# Patient Record
Sex: MALE | Race: WHITE | Employment: OTHER | URBAN - METROPOLITAN AREA
[De-identification: names, ages, dates, MRNs, and addresses within clinical notes are randomized per-mention and may not be internally consistent; named-entity substitution may affect disease eponyms.]

---

## 2017-02-13 ENCOUNTER — OFFICE VISIT (OUTPATIENT)
Dept: INTERNAL MEDICINE CLINIC | Facility: CLINIC | Age: 77
End: 2017-02-13

## 2017-02-13 VITALS
HEART RATE: 77 BPM | HEIGHT: 70 IN | BODY MASS INDEX: 30.4 KG/M2 | OXYGEN SATURATION: 94 % | TEMPERATURE: 98 F | WEIGHT: 212.38 LBS | DIASTOLIC BLOOD PRESSURE: 64 MMHG | SYSTOLIC BLOOD PRESSURE: 108 MMHG

## 2017-02-13 DIAGNOSIS — J06.9 URI, ACUTE: ICD-10-CM

## 2017-02-13 DIAGNOSIS — R30.0 DYSURIA: ICD-10-CM

## 2017-02-13 DIAGNOSIS — I25.10 CORONARY ARTERY DISEASE INVOLVING NATIVE CORONARY ARTERY OF NATIVE HEART WITHOUT ANGINA PECTORIS: ICD-10-CM

## 2017-02-13 DIAGNOSIS — D68.9 COAGULOPATHY (HCC): ICD-10-CM

## 2017-02-13 DIAGNOSIS — I10 ESSENTIAL HYPERTENSION WITH GOAL BLOOD PRESSURE LESS THAN 140/90: ICD-10-CM

## 2017-02-13 DIAGNOSIS — M17.12 PRIMARY OSTEOARTHRITIS OF LEFT KNEE: Primary | ICD-10-CM

## 2017-02-13 DIAGNOSIS — E78.00 PURE HYPERCHOLESTEROLEMIA: ICD-10-CM

## 2017-02-13 PROCEDURE — G0463 HOSPITAL OUTPT CLINIC VISIT: HCPCS | Performed by: INTERNAL MEDICINE

## 2017-02-13 PROCEDURE — 99214 OFFICE O/P EST MOD 30 MIN: CPT | Performed by: INTERNAL MEDICINE

## 2017-02-13 RX ORDER — CEFUROXIME AXETIL 500 MG/1
500 TABLET ORAL 2 TIMES DAILY
Qty: 20 TABLET | Refills: 0 | Status: SHIPPED | OUTPATIENT
Start: 2017-02-13 | End: 2018-04-03

## 2017-02-13 RX ORDER — LORATADINE 10 MG/1
10 TABLET ORAL DAILY
COMMUNITY

## 2017-02-13 NOTE — PROGRESS NOTES
Tari Espinoza is a 68year old male. HPI:   Patient presents with:  Cough: Chest congestion   Pre-Op Exam: Left Knee 3/1 Dr Juni Hunter       69 y/o M who awaits left TKR with orthopedics, Dr Kelton Eden, at [facility] on 3/1/17.   He is taking no analgesics Allergies:    Nuts                    Pain              ROS:   Constitutional: no weight loss; no fatigue  Cardiovascular:  Negative for chest pain; negative palpitations  Respiratory:  Negative for cough, dyspnea and wheezing  Gastrointestinal:  Neg mg po qHS;  Rx per Dr Nanci Casas    Migraine headache  Affects left temporal area; cont Tylenol prn; avoid triptans due to CAD    URI  Ceftin 500 mg po BID #20    Coagulopathy  Check PT/INR    Dysuria  Check UA C&S           Orders This Visit:    Orders Placed T

## 2017-02-14 ENCOUNTER — LAB ENCOUNTER (OUTPATIENT)
Dept: LAB | Facility: HOSPITAL | Age: 77
End: 2017-02-14
Attending: INTERNAL MEDICINE
Payer: MEDICARE

## 2017-02-14 DIAGNOSIS — I10 ESSENTIAL HYPERTENSION WITH GOAL BLOOD PRESSURE LESS THAN 140/90: ICD-10-CM

## 2017-02-14 DIAGNOSIS — R30.0 DYSURIA: ICD-10-CM

## 2017-02-14 DIAGNOSIS — D68.9 COAGULOPATHY (HCC): ICD-10-CM

## 2017-02-14 LAB
ANION GAP SERPL CALC-SCNC: 11 MMOL/L (ref 0–18)
BACTERIA UR QL AUTO: NEGATIVE /HPF
BASOPHILS # BLD: 0 K/UL (ref 0–0.2)
BASOPHILS NFR BLD: 0 %
BILIRUB UR QL: NEGATIVE
BUN SERPL-MCNC: 25 MG/DL (ref 8–20)
BUN/CREAT SERPL: 18.4 (ref 10–20)
CALCIUM SERPL-MCNC: 8.7 MG/DL (ref 8.5–10.5)
CHLORIDE SERPL-SCNC: 99 MMOL/L (ref 95–110)
CLARITY UR: CLEAR
CO2 SERPL-SCNC: 25 MMOL/L (ref 22–32)
COLOR UR: YELLOW
CREAT SERPL-MCNC: 1.36 MG/DL (ref 0.5–1.5)
EOSINOPHIL # BLD: 0.5 K/UL (ref 0–0.7)
EOSINOPHIL NFR BLD: 9 %
ERYTHROCYTE [DISTWIDTH] IN BLOOD BY AUTOMATED COUNT: 13.2 % (ref 11–15)
GLUCOSE SERPL-MCNC: 153 MG/DL (ref 70–99)
GLUCOSE UR-MCNC: NEGATIVE MG/DL
HCT VFR BLD AUTO: 42.7 % (ref 41–52)
HGB BLD-MCNC: 14.3 G/DL (ref 13.5–17.5)
HGB UR QL STRIP.AUTO: NEGATIVE
INR BLD: 1 (ref 0.9–1.2)
KETONES UR-MCNC: NEGATIVE MG/DL
LEUKOCYTE ESTERASE UR QL STRIP.AUTO: NEGATIVE
LYMPHOCYTES # BLD: 1.5 K/UL (ref 1–4)
LYMPHOCYTES NFR BLD: 28 %
MCH RBC QN AUTO: 30.3 PG (ref 27–32)
MCHC RBC AUTO-ENTMCNC: 33.4 G/DL (ref 32–37)
MCV RBC AUTO: 90.7 FL (ref 80–100)
MONOCYTES # BLD: 0.5 K/UL (ref 0–1)
MONOCYTES NFR BLD: 9 %
NEUTROPHILS # BLD AUTO: 3 K/UL (ref 1.8–7.7)
NEUTROPHILS NFR BLD: 55 %
NITRITE UR QL STRIP.AUTO: NEGATIVE
OSMOLALITY UR CALC.SUM OF ELEC: 287 MOSM/KG (ref 275–295)
PH UR: 5 [PH] (ref 5–8)
PLATELET # BLD AUTO: 194 K/UL (ref 140–400)
PMV BLD AUTO: 7.4 FL (ref 7.4–10.3)
POTASSIUM SERPL-SCNC: 4 MMOL/L (ref 3.3–5.1)
PROT UR-MCNC: NEGATIVE MG/DL
PROTHROMBIN TIME: 13.1 SECONDS (ref 11.8–14.5)
RBC # BLD AUTO: 4.71 M/UL (ref 4.5–5.9)
RBC #/AREA URNS AUTO: 1 /HPF
SODIUM SERPL-SCNC: 135 MMOL/L (ref 136–144)
SP GR UR STRIP: 1.02 (ref 1–1.03)
UROBILINOGEN UR STRIP-ACNC: <2
VIT C UR-MCNC: 40 MG/DL
WBC # BLD AUTO: 5.4 K/UL (ref 4–11)
WBC #/AREA URNS AUTO: <1 /HPF

## 2017-02-14 PROCEDURE — 85025 COMPLETE CBC W/AUTO DIFF WBC: CPT

## 2017-02-14 PROCEDURE — 81003 URINALYSIS AUTO W/O SCOPE: CPT

## 2017-02-14 PROCEDURE — 85610 PROTHROMBIN TIME: CPT

## 2017-02-14 PROCEDURE — 80048 BASIC METABOLIC PNL TOTAL CA: CPT

## 2017-02-14 PROCEDURE — 36415 COLL VENOUS BLD VENIPUNCTURE: CPT

## 2017-02-14 PROCEDURE — 87086 URINE CULTURE/COLONY COUNT: CPT

## 2017-02-16 ENCOUNTER — TELEPHONE (OUTPATIENT)
Dept: INTERNAL MEDICINE CLINIC | Facility: CLINIC | Age: 77
End: 2017-02-16

## 2017-02-16 NOTE — TELEPHONE ENCOUNTER
Completed forms, labs, etc faxed to [facility] [place]: [phone]. Noted on fax cover sheet that 12/5/16 EKG at [facility] [place]. Fax receipt confirmed.

## 2017-02-16 NOTE — TELEPHONE ENCOUNTER
Please fax surgical clearance to [facility]. Pt is scheduled for surgery on 3/1/17 with Dr Bhaskar Carmichael has pre-op at [facility] on Tuesday, 2/21/17  Send medical clearance, labs and EKG.  If this was not completed please fax whatever is available  BVF#[phone]

## 2017-02-16 NOTE — TELEPHONE ENCOUNTER
All labs and urine OK; please notify pt; last EKG done at [address] on 12/5/16 at [address]; please FAX forms (attached in folder)

## 2017-02-16 NOTE — TELEPHONE ENCOUNTER
Christina wolfe to send copy of 2/13/17 office visit note and 2/14/17 lab results?  I do not see that EKG was done

## 2017-03-20 ENCOUNTER — OFFICE VISIT (OUTPATIENT)
Dept: PHYSICAL THERAPY | Facility: HOSPITAL | Age: 77
End: 2017-03-20
Attending: ORTHOPAEDIC SURGERY
Payer: MEDICARE

## 2017-03-20 DIAGNOSIS — Z96.652 STATUS POST TOTAL LEFT KNEE REPLACEMENT: Primary | ICD-10-CM

## 2017-03-20 PROCEDURE — 97110 THERAPEUTIC EXERCISES: CPT

## 2017-03-20 PROCEDURE — 97162 PT EVAL MOD COMPLEX 30 MIN: CPT

## 2017-03-20 NOTE — PROGRESS NOTES
KNEE EVALUATION:   Referring Physician: Dr. Donna Cuenca  Diagnosis: s/p L TKA  Date of Service: 3/20/2017     PATIENT SUMMARY:   Steven Torres is a 68year old y/o male who presents to therapy today with complaints of L knee pain s/p L TKA on 3/1/17.   P quad  Observation/Skin Assessment: incision site clean and healing.    Edema: at knee jt line R: 39.5cm  cm, L 43.5 cm;     AROM:   Knee    Flexion: L 110 deg   Extension: L lacking 9 deg     PROM:   Knee    Flexion:  L 114 deg heel slide   Extension:L lack education; Home Exercise Program    Education or treatment limitation: None  Rehab Potential: good    FOTO: not captured/100   Current status G Code: InitialMobility: Walking and Moving AroundCL: 60-79% impaired, limited, or restricted  Goal status G Code:

## 2017-03-23 ENCOUNTER — OFFICE VISIT (OUTPATIENT)
Dept: PHYSICAL THERAPY | Facility: HOSPITAL | Age: 77
End: 2017-03-23
Attending: ORTHOPAEDIC SURGERY
Payer: MEDICARE

## 2017-03-23 PROCEDURE — 97110 THERAPEUTIC EXERCISES: CPT

## 2017-03-23 PROCEDURE — 97140 MANUAL THERAPY 1/> REGIONS: CPT

## 2017-03-23 NOTE — PROGRESS NOTES
Diagnosis: s/p L TKA    Authorized # of Visits:  2/10 (per POC)         Next MD visit: none scheduled  Fall Risk: standard         Precautions: n/a           Medication Changes since last visit?: No  Subjective: Pt states that he is having some problems w

## 2017-03-27 ENCOUNTER — OFFICE VISIT (OUTPATIENT)
Dept: PHYSICAL THERAPY | Facility: HOSPITAL | Age: 77
End: 2017-03-27
Attending: ORTHOPAEDIC SURGERY
Payer: MEDICARE

## 2017-03-27 PROCEDURE — 97140 MANUAL THERAPY 1/> REGIONS: CPT

## 2017-03-27 PROCEDURE — 97110 THERAPEUTIC EXERCISES: CPT

## 2017-03-27 NOTE — PROGRESS NOTES
Diagnosis: s/p L TKA    Authorized # of Visits:  3/10 (per POC)         Next MD visit: none scheduled  Fall Risk: standard         Precautions: n/a           Medication Changes since last visit?: No  Subjective: Pt states that his leg feels better and he

## 2017-03-29 ENCOUNTER — OFFICE VISIT (OUTPATIENT)
Dept: PHYSICAL THERAPY | Facility: HOSPITAL | Age: 77
End: 2017-03-29
Attending: ORTHOPAEDIC SURGERY
Payer: MEDICARE

## 2017-03-29 PROCEDURE — 97110 THERAPEUTIC EXERCISES: CPT

## 2017-03-29 PROCEDURE — 97140 MANUAL THERAPY 1/> REGIONS: CPT

## 2017-03-29 NOTE — PROGRESS NOTES
Diagnosis: s/p L TKA    Authorized # of Visits:  4/10 (per POC)         Next MD visit: none scheduled  Fall Risk: standard         Precautions: n/a           Medication Changes since last visit?: No  Subjective: Pt states that he hardly used the pillow la

## 2017-03-31 ENCOUNTER — OFFICE VISIT (OUTPATIENT)
Dept: PHYSICAL THERAPY | Facility: HOSPITAL | Age: 77
End: 2017-03-31
Attending: ORTHOPAEDIC SURGERY
Payer: MEDICARE

## 2017-03-31 PROCEDURE — 97110 THERAPEUTIC EXERCISES: CPT

## 2017-03-31 PROCEDURE — 97140 MANUAL THERAPY 1/> REGIONS: CPT

## 2017-03-31 NOTE — PROGRESS NOTES
Diagnosis: s/p L TKA    Authorized # of Visits:  5/10 (per POC)         Next MD visit: 4/10/17  Fall Risk: standard         Precautions: n/a           Medication Changes since last visit?: No  Subjective: Patient reports his knee got very swollen after la Total Timed Treatment: 43 min  Total Treatment Time: 45 min

## 2017-04-03 ENCOUNTER — OFFICE VISIT (OUTPATIENT)
Dept: PHYSICAL THERAPY | Facility: HOSPITAL | Age: 77
End: 2017-04-03
Attending: ORTHOPAEDIC SURGERY
Payer: MEDICARE

## 2017-04-03 PROCEDURE — 97140 MANUAL THERAPY 1/> REGIONS: CPT

## 2017-04-03 PROCEDURE — 97110 THERAPEUTIC EXERCISES: CPT

## 2017-04-03 NOTE — PROGRESS NOTES
Diagnosis: s/p L TKA    Authorized # of Visits:  6/10 (per POC)         Next MD visit: 4/10/17  Fall Risk: standard         Precautions: n/a           Medication Changes since last visit?: No  Subjective: \"I have been doing pretty good.   I couldn't move HEP training, balance and proprioception training with focus on L knee extension. Charges:  TherEx x 2, MM x 1      Total Timed Treatment: 45 min  Total Treatment Time: 45 min

## 2017-04-05 ENCOUNTER — OFFICE VISIT (OUTPATIENT)
Dept: PHYSICAL THERAPY | Facility: HOSPITAL | Age: 77
End: 2017-04-05
Attending: ORTHOPAEDIC SURGERY
Payer: MEDICARE

## 2017-04-05 PROCEDURE — 97140 MANUAL THERAPY 1/> REGIONS: CPT

## 2017-04-05 PROCEDURE — 97110 THERAPEUTIC EXERCISES: CPT

## 2017-04-05 NOTE — PROGRESS NOTES
Diagnosis: s/p L TKA    Authorized # of Visits:  7/10 (per POC)         Next MD visit: 4/10/17  Fall Risk: standard         Precautions: n/a           Medication Changes since last visit?: No  Subjective: \"I am a little disappointed that I am not better. passive hamstring stretch 4-5x during the day for 1 minute or to his tolerance. Pt is in agreement        Plan: Continue for stretching, strengthening, ROM, HEP training, balance and proprioception training with focus on L knee extension. Charges:  Babs Jimenez

## 2017-04-07 ENCOUNTER — OFFICE VISIT (OUTPATIENT)
Dept: PHYSICAL THERAPY | Facility: HOSPITAL | Age: 77
End: 2017-04-07
Attending: ORTHOPAEDIC SURGERY
Payer: MEDICARE

## 2017-04-07 PROCEDURE — 97140 MANUAL THERAPY 1/> REGIONS: CPT

## 2017-04-07 PROCEDURE — 97110 THERAPEUTIC EXERCISES: CPT

## 2017-04-10 ENCOUNTER — OFFICE VISIT (OUTPATIENT)
Dept: PHYSICAL THERAPY | Facility: HOSPITAL | Age: 77
End: 2017-04-10
Attending: ORTHOPAEDIC SURGERY
Payer: MEDICARE

## 2017-04-10 PROCEDURE — 97140 MANUAL THERAPY 1/> REGIONS: CPT

## 2017-04-10 PROCEDURE — 97110 THERAPEUTIC EXERCISES: CPT

## 2017-04-10 NOTE — PROGRESS NOTES
Diagnosis: s/p L TKA    Authorized # of Visits:  9/10 (per POC)         Next MD visit: 4/14/17  Fall Risk: standard         Precautions: n/a           Medication Changes since last visit?: No  Subjective: \"I walked 1/2 on Saturday and more yesterday.  It PN next session    Charges:  TherEx x 2, MM x 1      Total Timed Treatment: 45 min  Total Treatment Time: 45 min

## 2017-04-12 ENCOUNTER — OFFICE VISIT (OUTPATIENT)
Dept: PHYSICAL THERAPY | Facility: HOSPITAL | Age: 77
End: 2017-04-12
Attending: ORTHOPAEDIC SURGERY
Payer: MEDICARE

## 2017-04-12 PROCEDURE — 97140 MANUAL THERAPY 1/> REGIONS: CPT

## 2017-04-12 PROCEDURE — 97110 THERAPEUTIC EXERCISES: CPT

## 2017-04-12 NOTE — PROGRESS NOTES
Patient Name: Junior Trevino, : 10/1/1940, MRN: [id number]   Date:  2017  Referring Physician:  Ben Caballero    Diagnosis: S/P L TKA  Progress Summary    Pt has attended 10, cancelled 0, and no shown 0 visits in Physical Therapy.      Pr distal  thigh x 3 min -  Not today  Supine contract/relax for knee extension x 15 reps  Not today  SLR with quad set 2x10 with 2# (c/o LBP) Not today  Prone ham curl 20x L NOT TODAY  Prone quad stetch by therapist 30\" x 3   Standing TKE's against wall 10\ Dept: [phone].     Sincerely,  Jorge Wells PT    Electronically signed by therapist: Jorge Wells PT    [de-identified] certification required: Yes  I certify the need for these services furnished under this plan of treatment and while under my care

## 2017-04-14 ENCOUNTER — OFFICE VISIT (OUTPATIENT)
Dept: PHYSICAL THERAPY | Facility: HOSPITAL | Age: 77
End: 2017-04-14
Attending: ORTHOPAEDIC SURGERY
Payer: MEDICARE

## 2017-04-14 PROCEDURE — 97110 THERAPEUTIC EXERCISES: CPT

## 2017-04-14 NOTE — PROGRESS NOTES
Diagnosis: s/p L TKA    Authorized # of Visits:  11/18 (per POC)         Next MD visit: 4/14/17  Fall Risk: standard         Precautions: n/a           Medication Changes since last visit?: No  Subjective: Patient reports his knee was hurting a lot Wednesd with step downs, demoing quad fatigue with repetitions. Plan: Continue PT for L knee ROM/stretching, LLE strengthening, gait and balance training     Charges:  TherEx x 3      Total Timed Treatment: 43min  Total Treatment Time: 43min

## 2017-04-17 ENCOUNTER — OFFICE VISIT (OUTPATIENT)
Dept: PHYSICAL THERAPY | Facility: HOSPITAL | Age: 77
End: 2017-04-17
Attending: ORTHOPAEDIC SURGERY
Payer: MEDICARE

## 2017-04-17 PROCEDURE — 97110 THERAPEUTIC EXERCISES: CPT

## 2017-04-17 PROCEDURE — 97140 MANUAL THERAPY 1/> REGIONS: CPT

## 2017-04-17 NOTE — PROGRESS NOTES
Diagnosis: s/p L TKA    Authorized # of Visits:  12/20 (per POC)         Next MD visit: 4/14/17  Fall Risk: standard         Precautions: n/a           Medication Changes since last visit?: No  Subjective:     Objective:    Therapeutic Exercises:  NuStep se Treatment Time: 45min

## 2017-04-19 ENCOUNTER — OFFICE VISIT (OUTPATIENT)
Dept: PHYSICAL THERAPY | Facility: HOSPITAL | Age: 77
End: 2017-04-19
Attending: ORTHOPAEDIC SURGERY
Payer: MEDICARE

## 2017-04-19 PROCEDURE — 97110 THERAPEUTIC EXERCISES: CPT

## 2017-04-19 NOTE — PROGRESS NOTES
Diagnosis: s/p L TKA    Authorized # of Visits:  13/20 (per POC)         Next MD visit: 4/14/17  Fall Risk: standard         Precautions: n/a           Medication Changes since last visit?: No  Subjective:   Pt states that his back is hurting as he is back Patient able to walk 0.7 mile before LBP limits him.       Plan: Continue PT for L knee ROM/stretching, LLE strengthening, gait and balance training     Charges: 3TE   Total Timed Treatment: 45min  Total Treatment Time: 45min

## 2017-04-26 ENCOUNTER — OFFICE VISIT (OUTPATIENT)
Dept: PHYSICAL THERAPY | Facility: HOSPITAL | Age: 77
End: 2017-04-26
Attending: ORTHOPAEDIC SURGERY
Payer: MEDICARE

## 2017-04-26 PROCEDURE — 97110 THERAPEUTIC EXERCISES: CPT

## 2017-04-26 NOTE — PROGRESS NOTES
Diagnosis: s/p L TKA    Authorized # of Visits:  14/20 (per POC)         Next MD visit: 4/14/17  Fall Risk: standard         Precautions: n/a           Medication Changes since last visit?: No  Subjective:   I walked 1.1 miles and I am working everyday.   I

## 2017-05-02 ENCOUNTER — OFFICE VISIT (OUTPATIENT)
Dept: PHYSICAL THERAPY | Facility: HOSPITAL | Age: 77
End: 2017-05-02
Attending: ORTHOPAEDIC SURGERY
Payer: MEDICARE

## 2017-05-02 PROCEDURE — 97110 THERAPEUTIC EXERCISES: CPT

## 2017-05-02 NOTE — PROGRESS NOTES
Diagnosis: s/p L TKA    Authorized # of Visits:  15/20 (per POC)         Next MD visit: July 2017  Fall Risk: standard         Precautions: n/a           Medication Changes since last visit?: No  Subjective:   Patient reports he is walking 1.1 miles in abo ROM/stretching, LLE strengthening, gait and balance training     Charges: 3TE   Total Timed Treatment: 40min  Total Treatment Time: 40min

## 2017-05-04 ENCOUNTER — APPOINTMENT (OUTPATIENT)
Dept: PHYSICAL THERAPY | Facility: HOSPITAL | Age: 77
End: 2017-05-04
Attending: ORTHOPAEDIC SURGERY
Payer: MEDICARE

## 2017-05-08 ENCOUNTER — OFFICE VISIT (OUTPATIENT)
Dept: PHYSICAL THERAPY | Facility: HOSPITAL | Age: 77
End: 2017-05-08
Attending: ORTHOPAEDIC SURGERY
Payer: MEDICARE

## 2017-05-08 PROCEDURE — 97110 THERAPEUTIC EXERCISES: CPT

## 2017-05-08 PROCEDURE — 97140 MANUAL THERAPY 1/> REGIONS: CPT

## 2017-05-08 NOTE — PROGRESS NOTES
Diagnosis: s/p L TKA    Authorized # of Visits:  16/20 (per POC)         Next MD visit: July 2017  Fall Risk: standard         Precautions: n/a           Medication Changes since last visit?: No  Subjective: \"I have been in pain since last Tuesday.  My kne

## 2017-05-11 ENCOUNTER — OFFICE VISIT (OUTPATIENT)
Dept: PHYSICAL THERAPY | Facility: HOSPITAL | Age: 77
End: 2017-05-11
Attending: ORTHOPAEDIC SURGERY
Payer: MEDICARE

## 2017-05-11 PROCEDURE — 97110 THERAPEUTIC EXERCISES: CPT

## 2017-05-11 PROCEDURE — 97140 MANUAL THERAPY 1/> REGIONS: CPT

## 2017-05-11 NOTE — PROGRESS NOTES
Diagnosis: s/p L TKA    Authorized # of Visits:  17/20 (per POC)         Next MD visit: July 2017  Fall Risk: standard         Precautions: n/a           Medication Changes since last visit?: No  Subjective: \"I feel about the same. Pain 2/10.  I definitel

## 2017-05-15 ENCOUNTER — OFFICE VISIT (OUTPATIENT)
Dept: PHYSICAL THERAPY | Facility: HOSPITAL | Age: 77
End: 2017-05-15
Attending: ORTHOPAEDIC SURGERY
Payer: MEDICARE

## 2017-05-15 PROCEDURE — 97110 THERAPEUTIC EXERCISES: CPT

## 2017-05-15 PROCEDURE — 97140 MANUAL THERAPY 1/> REGIONS: CPT

## 2017-05-15 NOTE — PROGRESS NOTES
Diagnosis: s/p L TKA    Authorized # of Visits:  18/20 (per POC)         Next MD visit: July 2017  Fall Risk: standard         Precautions: n/a           Medication Changes since last visit?: No  Subjective: \"Hansel so sore today.   I have been on my feet all

## 2017-06-07 ENCOUNTER — APPOINTMENT (OUTPATIENT)
Dept: PHYSICAL THERAPY | Facility: HOSPITAL | Age: 77
End: 2017-06-07
Attending: PHYSICIAN ASSISTANT
Payer: MEDICARE

## 2017-06-12 ENCOUNTER — OFFICE VISIT (OUTPATIENT)
Dept: PHYSICAL THERAPY | Facility: HOSPITAL | Age: 77
End: 2017-06-12
Attending: PHYSICIAN ASSISTANT
Payer: MEDICARE

## 2017-06-12 PROCEDURE — 97112 NEUROMUSCULAR REEDUCATION: CPT

## 2017-06-12 PROCEDURE — 97110 THERAPEUTIC EXERCISES: CPT

## 2017-06-12 NOTE — PROGRESS NOTES
Diagnosis: s/p L TKA    Authorized # of Visits:  19/20 (per POC)         Next MD visit: July 2017  Fall Risk: standard         Precautions: n/a           Medication Changes since last visit?: No  Subjective: \"My knee has been feeling pretty good.  It gets

## 2017-06-14 ENCOUNTER — APPOINTMENT (OUTPATIENT)
Dept: PHYSICAL THERAPY | Facility: HOSPITAL | Age: 77
End: 2017-06-14
Attending: PHYSICIAN ASSISTANT
Payer: MEDICARE

## 2017-06-19 ENCOUNTER — OFFICE VISIT (OUTPATIENT)
Dept: PHYSICAL THERAPY | Facility: HOSPITAL | Age: 77
End: 2017-06-19
Attending: PHYSICIAN ASSISTANT
Payer: MEDICARE

## 2017-06-19 PROCEDURE — 97110 THERAPEUTIC EXERCISES: CPT

## 2017-06-19 PROCEDURE — 97112 NEUROMUSCULAR REEDUCATION: CPT

## 2017-06-19 NOTE — PROGRESS NOTES
Patient Name: Yomaira Espinoza, : 10/1/1940, MRN: [id number]   Date:  2017  Referring Physician:  CLIFF Baxter    Diagnosis: s/p L TKA   Progress Summary    Pt has attended 20, cancelled 4, and no shown 0 visits in Physical Therapy. of stairs reciprocally without UE assist  MET   · Pt will increase hip and knee strength to grossly 4+/5 to be able to get up and down from the floor safely MET  · Pt will demonstrate increased hip ER/ABD strength to 5/5 to perform stepping and squatting a sec  Step up 8 inch 20  Step down 6 inch 20  SB squats on wall 3 x 10  SLB on foam 3 x 30 sec  Lunge on bosu 2 x 10  Single leg balance with rotation 20x  Bridge with add sqz 3x 10  Heel toe walk on  Line 2 laps    Assessment: SEE PN    Plan:  SEE PNn

## 2017-06-21 ENCOUNTER — APPOINTMENT (OUTPATIENT)
Dept: PHYSICAL THERAPY | Facility: HOSPITAL | Age: 77
End: 2017-06-21
Attending: PHYSICIAN ASSISTANT
Payer: MEDICARE

## 2017-06-28 ENCOUNTER — OFFICE VISIT (OUTPATIENT)
Dept: PHYSICAL THERAPY | Facility: HOSPITAL | Age: 77
End: 2017-06-28
Attending: PHYSICIAN ASSISTANT
Payer: MEDICARE

## 2017-06-28 PROCEDURE — 97110 THERAPEUTIC EXERCISES: CPT

## 2017-06-28 NOTE — PROGRESS NOTES
Patient Name: Kimberly Salomon, : 10/1/1940, MRN: [id number]   Date:  2017  Referring Physician:  CLIFF Archer    Diagnosis: s/p L TKA   Discharge Summary    Pt has attended 21, cancelled 4, and no shown 0 visits in Physical Therapy MET   · Pt will increase hip and knee strength to grossly 4+/5 to be able to get up and down from the floor safely MET  · Pt will demonstrate increased hip ER/ABD strength to 5/5 to perform stepping and squatting activities without excessive femoral IR/ADD 20x  Bridge with add sqz 3x 10  Heel toe walk on  Line 2 laps  Bike 10 min  Assessment: SEE DC    Plan:  SEE DC    Charges: 2TE,1NM  Total Timed Treatment: 45min  Total Treatment Time: 45min

## 2017-07-25 ENCOUNTER — OFFICE VISIT (OUTPATIENT)
Dept: OTOLARYNGOLOGY | Facility: CLINIC | Age: 77
End: 2017-07-25

## 2017-07-25 ENCOUNTER — OFFICE VISIT (OUTPATIENT)
Dept: AUDIOLOGY | Facility: CLINIC | Age: 77
End: 2017-07-25

## 2017-07-25 VITALS
SYSTOLIC BLOOD PRESSURE: 106 MMHG | TEMPERATURE: 97 F | WEIGHT: 210 LBS | HEIGHT: 70 IN | DIASTOLIC BLOOD PRESSURE: 70 MMHG | BODY MASS INDEX: 30.06 KG/M2

## 2017-07-25 DIAGNOSIS — H61.23 BILATERAL IMPACTED CERUMEN: ICD-10-CM

## 2017-07-25 DIAGNOSIS — H91.93 BILATERAL HEARING LOSS, UNSPECIFIED HEARING LOSS TYPE: Primary | ICD-10-CM

## 2017-07-25 DIAGNOSIS — H90.3 SENSORINEURAL HEARING LOSS, BILATERAL: Primary | ICD-10-CM

## 2017-07-25 PROCEDURE — 99212 OFFICE O/P EST SF 10 MIN: CPT | Performed by: OTOLARYNGOLOGY

## 2017-07-25 PROCEDURE — 92557 COMPREHENSIVE HEARING TEST: CPT | Performed by: AUDIOLOGIST

## 2017-07-25 PROCEDURE — 92567 TYMPANOMETRY: CPT | Performed by: AUDIOLOGIST

## 2017-07-25 PROCEDURE — G0268 REMOVAL OF IMPACTED WAX MD: HCPCS | Performed by: OTOLARYNGOLOGY

## 2017-07-25 NOTE — PROGRESS NOTES
Sonia Zimmerman is a 68year old male. Patient presents with:  Ear Wax: both ears    HPI:   His wife feels as though his hearing has slowly declining.  He is having more difficulty and feels that his ears are most likely blocked    Current Outpatient Presc Normal    Nasal Normal External nose - Normal. Nasal septum - Normal, Turbinates - Normal   Neurological     Neck Exam     Psychiatric     Lymph Detail     Eyes     Ears Normal Inspection - Right: Normal, Left: Normal. Canal - Left: Normal. TM - Right: Nor

## 2017-07-27 NOTE — PROGRESS NOTES
AUDIOGRAM     Kimberly Salomon was referred for testing by Rayray Mann V due to hearing loss. 10/1/1940  [id number]    Pt reported history of work related noise exposure.     Otoscopic Inspection:  Right ear:  No cerumen  Left ear:  No cerumen    Audiom

## 2018-03-28 ENCOUNTER — TELEPHONE (OUTPATIENT)
Dept: INTERNAL MEDICINE CLINIC | Facility: CLINIC | Age: 78
End: 2018-03-28

## 2018-04-03 ENCOUNTER — TELEPHONE (OUTPATIENT)
Dept: INTERNAL MEDICINE CLINIC | Facility: CLINIC | Age: 78
End: 2018-04-03

## 2018-04-03 ENCOUNTER — OFFICE VISIT (OUTPATIENT)
Dept: INTERNAL MEDICINE CLINIC | Facility: CLINIC | Age: 78
End: 2018-04-03

## 2018-04-03 VITALS
DIASTOLIC BLOOD PRESSURE: 72 MMHG | BODY MASS INDEX: 30.92 KG/M2 | WEIGHT: 216 LBS | SYSTOLIC BLOOD PRESSURE: 130 MMHG | TEMPERATURE: 99 F | HEART RATE: 89 BPM | OXYGEN SATURATION: 95 % | HEIGHT: 70 IN

## 2018-04-03 DIAGNOSIS — I25.10 CORONARY ARTERY DISEASE INVOLVING NATIVE CORONARY ARTERY OF NATIVE HEART WITHOUT ANGINA PECTORIS: ICD-10-CM

## 2018-04-03 DIAGNOSIS — J30.9 ALLERGIC SINUSITIS: Primary | ICD-10-CM

## 2018-04-03 DIAGNOSIS — I10 ESSENTIAL HYPERTENSION WITH GOAL BLOOD PRESSURE LESS THAN 140/90: ICD-10-CM

## 2018-04-03 PROCEDURE — G0463 HOSPITAL OUTPT CLINIC VISIT: HCPCS | Performed by: INTERNAL MEDICINE

## 2018-04-03 PROCEDURE — 99213 OFFICE O/P EST LOW 20 MIN: CPT | Performed by: INTERNAL MEDICINE

## 2018-04-03 RX ORDER — PREDNISONE 20 MG/1
TABLET ORAL
Qty: 8 TABLET | Refills: 0 | Status: SHIPPED | OUTPATIENT
Start: 2018-04-03 | End: 2018-04-21 | Stop reason: ALTCHOICE

## 2018-04-03 NOTE — PROGRESS NOTES
Amy Pearson is a 68year old male. HPI:   Patient presents with:  Sinus Problem: PND, Productive cough, chest congestion  x 1 month,        Patient relates that he has nasal congestion. Postnasal drip. Coughing. Green mucus.   It is the postnasal d Social History:  Smoking status: Former Smoker                                                              Packs/day: 0.50      Years: 9.00         Types: Cigarettes     Quit date: 7/26/1974  Smokeless tobacco: Never Used                      Alcohol us blood pressure less than 140/90  Stable. Blood pressure under satisfactory control. On therapy. The patient indicates understanding of these issues and agrees to the plan. The patient is asked to call if symptoms not resolved.     Callum Wick MD

## 2018-04-21 ENCOUNTER — OFFICE VISIT (OUTPATIENT)
Dept: OTOLARYNGOLOGY | Facility: CLINIC | Age: 78
End: 2018-04-21

## 2018-04-21 VITALS
SYSTOLIC BLOOD PRESSURE: 109 MMHG | WEIGHT: 214 LBS | BODY MASS INDEX: 30.64 KG/M2 | DIASTOLIC BLOOD PRESSURE: 71 MMHG | TEMPERATURE: 98 F | HEIGHT: 70 IN

## 2018-04-21 DIAGNOSIS — H61.23 BILATERAL IMPACTED CERUMEN: Primary | ICD-10-CM

## 2018-04-21 PROCEDURE — 69210 REMOVE IMPACTED EAR WAX UNI: CPT | Performed by: OTOLARYNGOLOGY

## 2018-04-21 RX ORDER — FLUTICASONE PROPIONATE 50 MCG
1 SPRAY, SUSPENSION (ML) NASAL 2 TIMES DAILY
Qty: 1 BOTTLE | Refills: 3 | Status: SHIPPED | OUTPATIENT
Start: 2018-04-21 | End: 2018-11-28 | Stop reason: ALTCHOICE

## 2018-04-21 RX ORDER — MONTELUKAST SODIUM 10 MG/1
10 TABLET ORAL NIGHTLY
Qty: 30 TABLET | Refills: 3 | Status: SHIPPED | OUTPATIENT
Start: 2018-04-21 | End: 2018-04-30

## 2018-04-21 NOTE — PROGRESS NOTES
Shazia Griffiths is a 68year old male.   Patient presents with:  Ear Wax: bilateral ear cleaning       HISTORY OF PRESENT ILLNESS    Patient presents for cerumen removal. No other complaints or concerns at this time    Social History    Marital status: Mar Integumentary Negative Frequent skin infections, pigment change and rash. Hema/Lymph Negative Easy bleeding and easy bruising. PHYSICAL EXAM    /71 (BP Location: Left arm, Patient Position: Sitting, Cuff Size: large)   Temp 98.3 °F (36. MG Oral Tab, Take  by mouth. take 1 tablet by oral route  every day, Disp: , Rfl:   ASSESSMENT AND PLAN    1. Bilateral impacted cerumen    - REMOVAL IMPACTED CERUMEN REQUIRING INSTRUMENTATION, UNILATERAL      All cerumen was removed using microscopy.  I ha

## 2018-04-30 ENCOUNTER — OFFICE VISIT (OUTPATIENT)
Dept: SURGERY | Facility: CLINIC | Age: 78
End: 2018-04-30

## 2018-04-30 ENCOUNTER — APPOINTMENT (OUTPATIENT)
Dept: LAB | Facility: HOSPITAL | Age: 78
End: 2018-04-30
Attending: UROLOGY
Payer: MEDICARE

## 2018-04-30 VITALS
SYSTOLIC BLOOD PRESSURE: 110 MMHG | BODY MASS INDEX: 30.64 KG/M2 | WEIGHT: 214 LBS | DIASTOLIC BLOOD PRESSURE: 70 MMHG | HEIGHT: 70 IN | HEART RATE: 71 BPM | TEMPERATURE: 98 F

## 2018-04-30 DIAGNOSIS — Z80.42 FAMILY HISTORY OF MALIGNANT NEOPLASM OF PROSTATE: Primary | ICD-10-CM

## 2018-04-30 DIAGNOSIS — Z80.42 FAMILY HISTORY OF MALIGNANT NEOPLASM OF PROSTATE: ICD-10-CM

## 2018-04-30 PROCEDURE — G0463 HOSPITAL OUTPT CLINIC VISIT: HCPCS | Performed by: UROLOGY

## 2018-04-30 PROCEDURE — 99204 OFFICE O/P NEW MOD 45 MIN: CPT | Performed by: UROLOGY

## 2018-04-30 PROCEDURE — 84153 ASSAY OF PSA TOTAL: CPT

## 2018-04-30 PROCEDURE — 36415 COLL VENOUS BLD VENIPUNCTURE: CPT

## 2018-05-01 NOTE — PROGRESS NOTES
SUBJECTIVE:  Yomaira Espinoza is a 68year old male who is a new patient to our department, and presents with a chief complaint of family history of prostate cancer. He states that the problem is unchanged. Symptoms include none.   He denies any irritative claudication. GASTROINTESTINAL:  Negative for nausea, vomiting, diarrhea, constipation, heartburn or indigestion, abdominal pains, bloody or tarry stools. GENERAL: Denies:  weight gain, weight loss, fever, night sweats, bone pain, malaise and fatigue.  Po

## 2018-05-22 ENCOUNTER — TELEPHONE (OUTPATIENT)
Dept: SURGERY | Facility: CLINIC | Age: 78
End: 2018-05-22

## 2018-05-22 NOTE — TELEPHONE ENCOUNTER
Phoned pt on home # , however phone rings for extended period of time, then turns to busy signal. Phoned on number listed as work, and pt answered. Verified identity with spelling of last name and  Read to him 's reply as outlined below.  He

## 2018-11-28 ENCOUNTER — OFFICE VISIT (OUTPATIENT)
Dept: INTERNAL MEDICINE CLINIC | Facility: CLINIC | Age: 78
End: 2018-11-28
Payer: MEDICARE

## 2018-11-28 VITALS
HEART RATE: 73 BPM | WEIGHT: 213.81 LBS | OXYGEN SATURATION: 97 % | BODY MASS INDEX: 32.03 KG/M2 | TEMPERATURE: 99 F | HEIGHT: 68.5 IN | DIASTOLIC BLOOD PRESSURE: 72 MMHG | SYSTOLIC BLOOD PRESSURE: 124 MMHG

## 2018-11-28 DIAGNOSIS — E78.00 PURE HYPERCHOLESTEROLEMIA: ICD-10-CM

## 2018-11-28 DIAGNOSIS — I25.10 CORONARY ARTERY DISEASE INVOLVING NATIVE CORONARY ARTERY OF NATIVE HEART WITHOUT ANGINA PECTORIS: ICD-10-CM

## 2018-11-28 DIAGNOSIS — I10 ESSENTIAL HYPERTENSION WITH GOAL BLOOD PRESSURE LESS THAN 140/90: ICD-10-CM

## 2018-11-28 DIAGNOSIS — N18.30 CKD (CHRONIC KIDNEY DISEASE), STAGE III (HCC): ICD-10-CM

## 2018-11-28 DIAGNOSIS — Z12.5 SCREENING PSA (PROSTATE SPECIFIC ANTIGEN): ICD-10-CM

## 2018-11-28 DIAGNOSIS — Z00.00 PHYSICAL EXAM, ANNUAL: Primary | ICD-10-CM

## 2018-11-28 DIAGNOSIS — J31.0 OTHER RHINITIS: ICD-10-CM

## 2018-11-28 DIAGNOSIS — M17.12 PRIMARY OSTEOARTHRITIS OF LEFT KNEE: ICD-10-CM

## 2018-11-28 PROCEDURE — G0439 PPPS, SUBSEQ VISIT: HCPCS | Performed by: INTERNAL MEDICINE

## 2018-11-28 RX ORDER — MONTELUKAST SODIUM 10 MG/1
10 TABLET ORAL NIGHTLY
Qty: 30 TABLET | Refills: 5 | Status: SHIPPED | OUTPATIENT
Start: 2018-11-28 | End: 2019-01-31

## 2018-11-28 NOTE — PROGRESS NOTES
Ana Montoya is a 66year old male.     HPI:   Patient presents with:  Physical: Medicare annual exam      67 y/o M here for subsequent Medicare annual wellness exam; reports +post-nasal drip x 4-5 months; +cough; minimal productive sputum; takes lorata Lisinopril-Hydrochlorothiazide 10-12.5 MG Oral Tab Take  by mouth.  take 1 tablet by oral route  every day Disp:  Rfl:        Allergies:    [address]     In the past six months, have you lost more than 10 pounds wi (Required for AWV/SWV)      Hearing Screening    Time taken:  11/28/2018  5:37 PM  Entry User:  Marleni Valladares RN  Screening Method:  Whisper Test  Whisper Test Result:  Pass         Visual Acuity     Right Eye Visual Acuity: Uncorrected Left Eye Visual Update Immunization Activity if applicable    Pneumococcal No orders found for this or any previous visit. Update Immunization Activity if applicable    Hepatitis B No orders found for this or any previous visit.  Update Immunization Activity if applicable appetite, diarrhea and vomiting; no melena or hematochezia  Musculoskeletal:  Negative for arthralgias or myalgias  Genitourinary:  Negative for dysuria or polyuria  Hema/Lymph:  Negative for easy bleeding and easy bruising  Integumentary:  Negative for pr management recommended by Dr Santhosh Torres  HTN (hypertension)  on lisininopril HCTZ 10/12.5 mg po qD ,check CBC, TSH  Hypercholesteremia  LDL 85 in Oct 2017 on simvastatin 20 mg po qHS;  Rx per Dr Santhosh Torres  CKD  Creatinine 1.57 in Oct 2018 when checked at [facility]

## 2019-01-31 ENCOUNTER — OFFICE VISIT (OUTPATIENT)
Dept: INTERNAL MEDICINE CLINIC | Facility: CLINIC | Age: 79
End: 2019-01-31
Payer: MEDICARE

## 2019-01-31 ENCOUNTER — TELEPHONE (OUTPATIENT)
Dept: INTERNAL MEDICINE CLINIC | Facility: CLINIC | Age: 79
End: 2019-01-31

## 2019-01-31 ENCOUNTER — HOSPITAL ENCOUNTER (OUTPATIENT)
Dept: GENERAL RADIOLOGY | Facility: HOSPITAL | Age: 79
Discharge: HOME | End: 2019-01-31
Attending: INTERNAL MEDICINE
Payer: MEDICARE

## 2019-01-31 VITALS
WEIGHT: 214.19 LBS | SYSTOLIC BLOOD PRESSURE: 130 MMHG | BODY MASS INDEX: 32.09 KG/M2 | HEIGHT: 68.5 IN | DIASTOLIC BLOOD PRESSURE: 80 MMHG | TEMPERATURE: 98 F | HEART RATE: 68 BPM

## 2019-01-31 DIAGNOSIS — M53.3 SACRAL PAIN: ICD-10-CM

## 2019-01-31 DIAGNOSIS — M53.3 SACRAL PAIN: Primary | ICD-10-CM

## 2019-01-31 PROCEDURE — 99213 OFFICE O/P EST LOW 20 MIN: CPT | Performed by: INTERNAL MEDICINE

## 2019-01-31 PROCEDURE — 72220 X-RAY EXAM SACRUM TAILBONE: CPT | Performed by: INTERNAL MEDICINE

## 2019-01-31 PROCEDURE — G0463 HOSPITAL OUTPT CLINIC VISIT: HCPCS | Performed by: INTERNAL MEDICINE

## 2019-01-31 NOTE — PROGRESS NOTES
Rosas Urena is a 66year old male.     HPI:   Patient presents with:  Fall: slipped on ice monday , since then pain in sacrum , bumped head also      65 y/o M with recent fall on ice 3 d ago here with pain in sacrum; pain worse with weight-bearing and PAIN              ROS:   Constitutional: no weight loss; no fatigue  Cardiovascular:  Negative for chest pain; negative palpitations  Respiratory:  Negative for cough, dyspnea and wheezing  Gastrointestinal:  Negative for abdominal pain, constipation, decr

## 2019-01-31 NOTE — TELEPHONE ENCOUNTER
Slipped on the ice on his driveway a couple of days ago, hurt his tailbone/still sore, should pt come here to see Dr Roy Oar or go to ER     Please advise [phone]

## 2019-01-31 NOTE — TELEPHONE ENCOUNTER
Per DR. FELIX order patient to be seen today. Called patient , spoke with spouse per hipaa and relayed message that DR. FLEIX wants to see patient in the office - transferred to [facility] to schedule patient

## 2019-03-16 ENCOUNTER — OFFICE VISIT (OUTPATIENT)
Dept: OTOLARYNGOLOGY | Facility: CLINIC | Age: 79
End: 2019-03-16
Payer: MEDICARE

## 2019-03-16 VITALS
WEIGHT: 214 LBS | BODY MASS INDEX: 32.06 KG/M2 | HEIGHT: 68.5 IN | SYSTOLIC BLOOD PRESSURE: 130 MMHG | TEMPERATURE: 98 F | DIASTOLIC BLOOD PRESSURE: 80 MMHG

## 2019-03-16 DIAGNOSIS — H61.23 BILATERAL IMPACTED CERUMEN: Primary | ICD-10-CM

## 2019-03-16 PROCEDURE — 69210 REMOVE IMPACTED EAR WAX UNI: CPT | Performed by: OTOLARYNGOLOGY

## 2019-03-17 NOTE — PROGRESS NOTES
Shelly English is a 66year old male. Patient presents with:  Ear Problem: Ear cleaning    HPI:   Is ears are both completely blocked with wax.   He feels that his hearing aids are not working as well    Current Outpatient Medications:  simvastatin 20 MG Oral/Oropharynx Normal Lips - Normal, Tonsils - Normal, Tongue - Normal    Nasal Normal External nose - Normal. Nasal septum - Normal, Turbinates - Normal   Neurological     Neck Exam     Psychiatric     Lymph Detail     Eyes     Ears Normal Inspection -

## 2019-03-30 ENCOUNTER — OFFICE VISIT (OUTPATIENT)
Dept: DERMATOLOGY CLINIC | Facility: CLINIC | Age: 79
End: 2019-03-30
Payer: MEDICARE

## 2019-03-30 DIAGNOSIS — L30.8 PSORIASIFORM DERMATITIS: ICD-10-CM

## 2019-03-30 DIAGNOSIS — L57.0 AK (ACTINIC KERATOSIS): Primary | ICD-10-CM

## 2019-03-30 PROCEDURE — 17003 DESTRUCT PREMALG LES 2-14: CPT | Performed by: DERMATOLOGY

## 2019-03-30 PROCEDURE — G0463 HOSPITAL OUTPT CLINIC VISIT: HCPCS | Performed by: DERMATOLOGY

## 2019-03-30 PROCEDURE — 17000 DESTRUCT PREMALG LESION: CPT | Performed by: DERMATOLOGY

## 2019-03-30 PROCEDURE — 99202 OFFICE O/P NEW SF 15 MIN: CPT | Performed by: DERMATOLOGY

## 2019-03-30 RX ORDER — CLOBETASOL PROPIONATE 0.5 MG/G
1 CREAM TOPICAL 2 TIMES DAILY
Qty: 60 G | Refills: 3 | Status: SHIPPED | OUTPATIENT
Start: 2019-03-30 | End: 2019-09-30 | Stop reason: ALTCHOICE

## 2019-04-02 ENCOUNTER — OFFICE VISIT (OUTPATIENT)
Dept: INTERNAL MEDICINE CLINIC | Facility: CLINIC | Age: 79
End: 2019-04-02
Payer: MEDICARE

## 2019-04-02 VITALS
HEART RATE: 56 BPM | HEIGHT: 70 IN | DIASTOLIC BLOOD PRESSURE: 64 MMHG | TEMPERATURE: 98 F | BODY MASS INDEX: 30.55 KG/M2 | WEIGHT: 213.38 LBS | OXYGEN SATURATION: 98 % | SYSTOLIC BLOOD PRESSURE: 116 MMHG

## 2019-04-02 DIAGNOSIS — K13.79 MOUTH SORES: Primary | ICD-10-CM

## 2019-04-02 PROCEDURE — G0463 HOSPITAL OUTPT CLINIC VISIT: HCPCS | Performed by: INTERNAL MEDICINE

## 2019-04-02 PROCEDURE — 99213 OFFICE O/P EST LOW 20 MIN: CPT | Performed by: INTERNAL MEDICINE

## 2019-04-02 RX ORDER — VALACYCLOVIR HYDROCHLORIDE 500 MG/1
TABLET, FILM COATED ORAL
Qty: 4 TABLET | Refills: 0 | Status: SHIPPED | OUTPATIENT
Start: 2019-04-02 | End: 2019-04-10

## 2019-04-02 NOTE — PROGRESS NOTES
Perla Tolbert is a 66year old male who presents for     Here w his wife. Sores in mouth for 1 wk. Hurts with hot food against it the sore of inner lower lip. Has been gargling w peroxide and water. Not better. Feels ok otherwise.    No exposur kg)  04/30/18 : 214 lb (97.1 kg)  04/21/18 : 214 lb (97.1 kg)      General: appears well nourished and in no acute distress  Mouth--1.8 cm shallow ulcer inner lower lip with whitish material at edge. 2 small scabs on upper lip.   Neurologic: alert and orie

## 2019-04-08 NOTE — PROGRESS NOTES
Amy Sports is a 66year old male.   HPI:     CC:  Patient presents with:  Lesion: NEW PT here for a lesion on his right inner upper thigh x1 yr doesnt remember what happend thought maybe it was a pimple but its never healed, no drainage, no pain some aspirin 81 mg effervescent tablet Disp:  Rfl:    Lisinopril-Hydrochlorothiazide 10-12.5 MG Oral Tab Take  by mouth. take 1 tablet by oral route  every day Disp:  Rfl:    valACYclovir HCl (VALTREX) 500 MG Oral Tab Take 2 tabs now and repeat dose in 12 hrs. Relationships      Social connections:        Talks on phone: Not on file        Gets together: Not on file        Attends Yazidi service: Not on file        Active member of club or organization: Not on file        Attends meetings of clubs or [place] concerns above. Patient has been in their usual state of health. History, medications, allergies reviewed as noted. ROS:  Denies any other systemic complaints. No new or changeing lesions other than noted above.  No fevers, chills, night sweats, u with medications and grid. Overall skincare, liberal use of emollients discussed. Consider more aggressive therapy if worsening. Patient will let us know how they are doing over the next several weeks. Await clinical response to above therapy.   Recheck i

## 2019-04-10 ENCOUNTER — OFFICE VISIT (OUTPATIENT)
Dept: INTERNAL MEDICINE CLINIC | Facility: CLINIC | Age: 79
End: 2019-04-10
Payer: MEDICARE

## 2019-04-10 VITALS
SYSTOLIC BLOOD PRESSURE: 104 MMHG | HEIGHT: 70 IN | DIASTOLIC BLOOD PRESSURE: 70 MMHG | WEIGHT: 210.19 LBS | BODY MASS INDEX: 30.09 KG/M2 | HEART RATE: 78 BPM | TEMPERATURE: 98 F | OXYGEN SATURATION: 95 %

## 2019-04-10 DIAGNOSIS — I10 ESSENTIAL HYPERTENSION WITH GOAL BLOOD PRESSURE LESS THAN 140/90: ICD-10-CM

## 2019-04-10 DIAGNOSIS — K13.79 MOUTH SORES: Primary | ICD-10-CM

## 2019-04-10 DIAGNOSIS — E78.00 PURE HYPERCHOLESTEROLEMIA: ICD-10-CM

## 2019-04-10 DIAGNOSIS — N18.30 CKD (CHRONIC KIDNEY DISEASE), STAGE III (HCC): ICD-10-CM

## 2019-04-10 PROCEDURE — 99214 OFFICE O/P EST MOD 30 MIN: CPT | Performed by: INTERNAL MEDICINE

## 2019-04-10 PROCEDURE — G0463 HOSPITAL OUTPT CLINIC VISIT: HCPCS | Performed by: INTERNAL MEDICINE

## 2019-04-10 RX ORDER — ACYCLOVIR 400 MG/1
400 TABLET ORAL 3 TIMES DAILY
Qty: 30 TABLET | Refills: 0 | Status: SHIPPED | OUTPATIENT
Start: 2019-04-10

## 2019-04-11 NOTE — PROGRESS NOTES
Larissa Armstrong is a 66year old male. HPI:   Patient presents with:  Checkup: sores in the mouth - was puton Valcyclovir saw DR. VANCE      67 y/o M with one week h/o sores to mucosa of mouth here for F/U; took Valtrex 1 gm po BID x 2 doses with short-live Chew  by mouth. aspirin 81 mg effervescent tablet Disp:  Rfl:    Lisinopril-Hydrochlorothiazide 10-12.5 MG Oral Tab Take  by mouth.  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

## 2019-04-29 RX ORDER — ACYCLOVIR 400 MG/1
400 TABLET ORAL 3 TIMES DAILY
Qty: 30 TABLET | Refills: 0 | OUTPATIENT
Start: 2019-04-29

## 2019-05-07 RX ORDER — ACYCLOVIR 400 MG/1
400 TABLET ORAL 3 TIMES DAILY
Qty: 30 TABLET | Refills: 0 | OUTPATIENT
Start: 2019-05-07

## 2019-08-16 NOTE — PROGRESS NOTES
Diagnosis: s/p L TKA    Authorized # of Visits:  8/10 (per POC)         Next MD visit: 4/14/17  Fall Risk: standard         Precautions: n/a           Medication Changes since last visit?: No  Subjective: Patient reports he thinks his knee might be a sydnee extension. Charges:  TherEx x 2, MM x 1      Total Timed Treatment: 43 min  Total Treatment Time: 43 min Complex Repair And Split-Thickness Skin Graft Text: The defect edges were debeveled with a #15 scalpel blade.  The primary defect was closed partially with a complex linear closure.  Given the location of the defect, shape of the defect and the proximity to free margins a split thickness skin graft was deemed most appropriate to repair the remaining defect.  The graft was trimmed to fit the size of the remaining defect.  The graft was then placed in the primary defect, oriented appropriately, and sutured into place.

## 2019-09-30 ENCOUNTER — OFFICE VISIT (OUTPATIENT)
Dept: SURGERY | Facility: CLINIC | Age: 79
End: 2019-09-30
Payer: MEDICARE

## 2019-09-30 ENCOUNTER — HOSPITAL ENCOUNTER (OUTPATIENT)
Dept: GENERAL RADIOLOGY | Facility: HOSPITAL | Age: 79
Discharge: HOME | End: 2019-09-30
Attending: PLASTIC SURGERY | Admitting: OCCUPATIONAL THERAPIST
Payer: MEDICARE

## 2019-09-30 ENCOUNTER — OFFICE VISIT (OUTPATIENT)
Dept: INTERNAL MEDICINE CLINIC | Facility: CLINIC | Age: 79
End: 2019-09-30
Payer: MEDICARE

## 2019-09-30 VITALS
OXYGEN SATURATION: 98 % | DIASTOLIC BLOOD PRESSURE: 70 MMHG | BODY MASS INDEX: 31 KG/M2 | SYSTOLIC BLOOD PRESSURE: 116 MMHG | RESPIRATION RATE: 12 BRPM | HEART RATE: 84 BPM | TEMPERATURE: 99 F | WEIGHT: 215 LBS

## 2019-09-30 DIAGNOSIS — M25.641 JOINT STIFFNESS OF HAND, RIGHT: Primary | ICD-10-CM

## 2019-09-30 DIAGNOSIS — S61.314A LACERATION OF RIGHT RING FINGER WITHOUT FOREIGN BODY WITH DAMAGE TO NAIL, INITIAL ENCOUNTER: Primary | ICD-10-CM

## 2019-09-30 DIAGNOSIS — S67.190A CRUSHING INJURY OF RIGHT INDEX FINGER, INITIAL ENCOUNTER: ICD-10-CM

## 2019-09-30 DIAGNOSIS — S62.664A NONDISPLACED FRACTURE OF DISTAL PHALANX OF RIGHT RING FINGER, INITIAL ENCOUNTER FOR CLOSED FRACTURE: ICD-10-CM

## 2019-09-30 DIAGNOSIS — S67.190A CRUSHING INJURY OF RIGHT INDEX FINGER, INITIAL ENCOUNTER: Primary | ICD-10-CM

## 2019-09-30 DIAGNOSIS — M62.81 DISTAL MUSCLE WEAKNESS: ICD-10-CM

## 2019-09-30 DIAGNOSIS — S61.200A UNSPECIFIED OPEN WOUND OF RIGHT INDEX FINGER WITHOUT DAMAGE TO NAIL, INITIAL ENCOUNTER: ICD-10-CM

## 2019-09-30 PROCEDURE — 99203 OFFICE O/P NEW LOW 30 MIN: CPT | Performed by: PLASTIC SURGERY

## 2019-09-30 PROCEDURE — 99213 OFFICE O/P EST LOW 20 MIN: CPT | Performed by: INTERNAL MEDICINE

## 2019-09-30 PROCEDURE — 73140 X-RAY EXAM OF FINGER(S): CPT | Performed by: PLASTIC SURGERY

## 2019-09-30 PROCEDURE — 29130 APPL FINGER SPLINT STATIC: CPT | Performed by: OCCUPATIONAL THERAPIST

## 2019-09-30 NOTE — H&P
Injury 1: RRF crush injury from sledgehammer,laceration  - Date: 09/23/19  - Days Since: 7  Junior Trevino is a 66year old male that presents with Patient presents with: Injury: RIF crush injury,laceration  .     REFERRED BY:  Guerda Vicente 400 MG Oral Tab Take 1 tablet (400 mg total) by mouth 3 (three) times daily. Disp: 30 tablet Rfl: 0   simvastatin 20 MG Oral Tab Take 20 mg by mouth every evening. Disp:  Rfl:    loratadine 10 MG Oral Tab Take 10 mg by mouth daily.  Disp:  Rfl:    aspirin 8 session: 7 to 9        Binge frequency: Weekly        Comment: 7 glasses of wine weekly      Drug use: No      Sexual activity: Not on file    Lifestyle      Physical activity:        Days per week: Not on file        Minutes per session: Not on file of Onset   • Diabetes Mother    • Lipids Mother    • Hypertension Mother    • Heart Disease Brother         CAD   • Prostate Cancer Brother        PHYSICAL EXAM:   CONSTITUTIONAL: Overall appearance - Normal  HEENT: Normocephalic  EYES: Conjunctiva - Right

## 2019-09-30 NOTE — PROGRESS NOTES
Subjective: My right ring finger was smashed by a sledge.       Objective:     Current level of performance:  ADL: Independent  Work: Owns an Automobile repair shop  Leisure: Not addressed    Measurements/Tests:  ROM:         N/A         Treatment Provided

## 2019-10-01 NOTE — PROGRESS NOTES
Mitchel Brown is a 78year old male. HPI:   Patient presents with:  Finger Pain: Pt reports he hit his finger with a hammer 1 week ago and still is bleeding when bandage is removed.        79 y/o M who injured right 4th finger when he was using sledge Lisinopril-Hydrochlorothiazide 10-12.5 MG Oral Tab Take  by mouth. take 1 tablet by oral route  every day Disp:  Rfl:    acyclovir 400 MG Oral Tab Take 1 tablet (400 mg total) by mouth 3 (three) times daily.  Disp: 30 tablet Rfl: 0       Allergies:    Nut

## 2019-10-15 ENCOUNTER — APPOINTMENT (OUTPATIENT)
Dept: SURGERY | Facility: CLINIC | Age: 79
End: 2019-10-15
Payer: MEDICARE

## 2019-10-15 DIAGNOSIS — M25.641 JOINT STIFFNESS OF HAND, RIGHT: Primary | ICD-10-CM

## 2019-10-15 DIAGNOSIS — M62.81 DISTAL MUSCLE WEAKNESS: ICD-10-CM

## 2019-10-15 PROCEDURE — 97166 OT EVAL MOD COMPLEX 45 MIN: CPT | Performed by: OCCUPATIONAL THERAPIST

## 2019-10-15 PROCEDURE — 97110 THERAPEUTIC EXERCISES: CPT | Performed by: OCCUPATIONAL THERAPIST

## 2019-10-16 NOTE — PROGRESS NOTES
OCCUPATIONAL THERAPY EVALUATION:   Delvin Huynh   [id number]       SUBJECTIVE:    HX of Injury: Right index finger crush injury. Chief Complaint:   No complaints. Precautions: Restricted use of the right hand.   Premorbid Functional Status: Independ evaluation and agrees to the plan of care. Carolin Zamorano I have reviewed the treatment plan and concur.    Marlin Cha MD

## 2019-10-21 ENCOUNTER — OFFICE VISIT (OUTPATIENT)
Dept: SURGERY | Facility: CLINIC | Age: 79
End: 2019-10-21
Payer: MEDICARE

## 2019-10-21 ENCOUNTER — APPOINTMENT (OUTPATIENT)
Dept: SURGERY | Facility: CLINIC | Age: 79
End: 2019-10-21
Payer: MEDICARE

## 2019-10-21 DIAGNOSIS — S61.200A UNSPECIFIED OPEN WOUND OF RIGHT INDEX FINGER WITHOUT DAMAGE TO NAIL, INITIAL ENCOUNTER: ICD-10-CM

## 2019-10-21 DIAGNOSIS — S62.664A NONDISPLACED FRACTURE OF DISTAL PHALANX OF RIGHT RING FINGER, INITIAL ENCOUNTER FOR CLOSED FRACTURE: ICD-10-CM

## 2019-10-21 DIAGNOSIS — S67.190A CRUSHING INJURY OF RIGHT INDEX FINGER, INITIAL ENCOUNTER: Primary | ICD-10-CM

## 2019-10-21 DIAGNOSIS — M62.81 DISTAL MUSCLE WEAKNESS: ICD-10-CM

## 2019-10-21 DIAGNOSIS — M25.641 JOINT STIFFNESS OF HAND, RIGHT: Primary | ICD-10-CM

## 2019-10-21 PROCEDURE — 97110 THERAPEUTIC EXERCISES: CPT | Performed by: OCCUPATIONAL THERAPIST

## 2019-10-21 PROCEDURE — 99212 OFFICE O/P EST SF 10 MIN: CPT | Performed by: PLASTIC SURGERY

## 2019-10-21 NOTE — PROGRESS NOTES
Injury 1: RRF crush / DP comminuted tuft fracture, nondisplaced  - Date: 09/23/19  - Days Since: 28    Pt here for FU of RRF crush/ DP fx   States he's doing \"good\"  C/o intermittent \"sharp\" pain to RRF volar DP   Denies numbness and tingling to RRF  P

## 2019-10-22 NOTE — PROGRESS NOTES
Subjective: My RIF feels good. Objective:     Current level of performance:  ADL: Independent  Work: Own a car service garage.   Leisure: Cars    Measurements/Tests:  ROM:  Testing By: tova   Strength Right: 85 #      Strength Left: 80 #      Tr

## 2020-04-07 ENCOUNTER — TELEPHONE (OUTPATIENT)
Dept: INTERNAL MEDICINE CLINIC | Facility: CLINIC | Age: 80
End: 2020-04-07

## 2020-04-07 DIAGNOSIS — J31.0 OTHER RHINITIS: Primary | ICD-10-CM

## 2020-04-07 PROCEDURE — 99441 PHONE E/M BY PHYS 5-10 MIN: CPT | Performed by: INTERNAL MEDICINE

## 2020-04-07 NOTE — TELEPHONE ENCOUNTER
Patient knows Dr. Grace Mckeon is not in today. Patient has chest congestion with post nasal drip. No temp  No coughing   Some chest wheezing, no heaviness  No SOB      No travels recently. Patient would like to see Dr. Grace Mckeon tomorrow.

## 2020-04-07 NOTE — TELEPHONE ENCOUNTER
To nursing, without fever, unlikely he has coronavirus but unable to know for sure. Therefore, treat symptomatically with increased fluids, rest, cough lozenges, etc.  Self isolate at home. ([place] currently understand shelter order).   Becomes short of

## 2020-04-07 NOTE — TELEPHONE ENCOUNTER
To Dr. Marcello Mccarthy----    Advised patient on MD message below; he verbalized understanding. Do you want telephone visit?

## 2020-04-07 NOTE — TELEPHONE ENCOUNTER
Respiratory infection triage:    Fever:  [x]  No fever (+sweats)  []  Fever>100.4    Cough:  [] Tight cough  [] Cough with exertion  [x] wet cough  [x] Sputum production, Color: Unknown does not spit out    Breathing:  [] Mild shortness of breath interferi

## 2020-04-08 RX ORDER — AMOXICILLIN AND CLAVULANATE POTASSIUM 875; 125 MG/1; MG/1
1 TABLET, FILM COATED ORAL 2 TIMES DAILY
Qty: 20 TABLET | Refills: 0 | Status: SHIPPED | OUTPATIENT
Start: 2020-04-08 | End: 2020-04-18

## 2020-04-08 NOTE — TELEPHONE ENCOUNTER
Virtual/Telephone Check-In    Rosas Urena verbally consents to a Virtual/Telephone Check-In service on 04/08/20. Patient understands and accepts financial responsibility for any deductible, co-insurance and/or co-pays associated with this service.

## 2020-04-08 NOTE — TELEPHONE ENCOUNTER
Pt consents to telephone visit  Pt is available all day at [phone]  Tasked to Dr Demetrio Plascencia

## 2020-05-01 ENCOUNTER — TELEPHONE (OUTPATIENT)
Dept: INTERNAL MEDICINE CLINIC | Facility: CLINIC | Age: 80
End: 2020-05-01

## 2020-05-01 RX ORDER — FLUTICASONE PROPIONATE 50 MCG
2 SPRAY, SUSPENSION (ML) NASAL DAILY
Qty: 1 BOTTLE | Refills: 5 | Status: SHIPPED | OUTPATIENT
Start: 2020-05-01

## 2020-05-01 NOTE — TELEPHONE ENCOUNTER
Pt called; +sinus and nasal congestion; +cough; no F/C; no SOB; +post-nasal drip    Imp- rhinitis     Using loratadine 10 mg po qD; does not like to use Flonase NS 2 sp EN qD; tried montelukast 10 mg po qD, though sxs did not improve with Rx  -s/p Augmenti

## 2020-05-01 NOTE — TELEPHONE ENCOUNTER
Respiratory infection triage:    Fever:  [x]  No fever  []  Fever>100.4    Cough:  [] Tight cough  [] Cough with exertion  [] Dry cough  [x] Sputum production, Color Breathing:  [x] Mild shortness of breath interfering with activity  [] Wheezing  [] Pain w

## 2021-02-03 DIAGNOSIS — Z23 NEED FOR VACCINATION: ICD-10-CM

## 2021-05-26 ENCOUNTER — TELEPHONE (OUTPATIENT)
Dept: INTERNAL MEDICINE CLINIC | Facility: CLINIC | Age: 81
End: 2021-05-26

## 2021-05-26 NOTE — TELEPHONE ENCOUNTER
Wife called  Pt having severe stomach pains today  He took 2 gas x gels & he is sleeping now  Scheduled appt for 6:30 pm with Dr Jennifer Roca  Will call back to cancel if pt feels better or if something changes    Sent to clinical as an Enrigue Bougie

## 2021-05-26 NOTE — TELEPHONE ENCOUNTER
Pts wife called and said that Mr. Myrtle Deleon is feeling a little better so they cancelled his appt for today.

## 2021-08-09 ENCOUNTER — TELEPHONE (OUTPATIENT)
Dept: INTERNAL MEDICINE CLINIC | Facility: CLINIC | Age: 81
End: 2021-08-09

## 2021-08-09 ENCOUNTER — APPOINTMENT (OUTPATIENT)
Dept: GENERAL RADIOLOGY | Age: 81
End: 2021-08-09
Attending: EMERGENCY MEDICINE
Payer: MEDICARE

## 2021-08-09 ENCOUNTER — HOSPITAL ENCOUNTER (OUTPATIENT)
Age: 81
Discharge: HOME | End: 2021-08-09
Attending: EMERGENCY MEDICINE
Payer: MEDICARE

## 2021-08-09 VITALS
HEART RATE: 73 BPM | TEMPERATURE: 97 F | DIASTOLIC BLOOD PRESSURE: 82 MMHG | OXYGEN SATURATION: 96 % | SYSTOLIC BLOOD PRESSURE: 120 MMHG | RESPIRATION RATE: 20 BRPM

## 2021-08-09 DIAGNOSIS — J12.82 PNEUMONIA DUE TO COVID-19 VIRUS: ICD-10-CM

## 2021-08-09 DIAGNOSIS — U07.1 COVID-19: Primary | ICD-10-CM

## 2021-08-09 DIAGNOSIS — U07.1 PNEUMONIA DUE TO COVID-19 VIRUS: ICD-10-CM

## 2021-08-09 PROCEDURE — 99213 OFFICE O/P EST LOW 20 MIN: CPT

## 2021-08-09 PROCEDURE — 71046 X-RAY EXAM CHEST 2 VIEWS: CPT | Performed by: EMERGENCY MEDICINE

## 2021-08-09 PROCEDURE — 99203 OFFICE O/P NEW LOW 30 MIN: CPT

## 2021-08-09 RX ORDER — DOXYCYCLINE HYCLATE 100 MG/1
100 CAPSULE ORAL 2 TIMES DAILY
Qty: 5 CAPSULE | Refills: 0 | Status: SHIPPED | OUTPATIENT
Start: 2021-08-09 | End: 2021-08-19

## 2021-08-09 NOTE — TELEPHONE ENCOUNTER
Patient is calling to speak with a nurse. Patient is wondering if he should take the antibody booster. Patient currently has covid at this time, went to urgent care in [place] today.     Komal Montgomery #[phone]

## 2021-08-09 NOTE — TELEPHONE ENCOUNTER
Pt wasn't feeling well  Tested positive for Covid on Saturday, he was vaccinated in January     Ran a fever on Saturday/no fever yesterday    Needs something for his cough    Using Coricidin tablet - which has helped   (Was taking Robitussin)    Call back

## 2021-08-09 NOTE — ED PROVIDER NOTES
Patient Seen in: [facility]      History   Patient presents with:  Covid    Stated Complaint: cough, chest congestion    HPI/Subjective:   HPI    17-year-old male presents for evaluation for cough.   Patient recently tested positive for COVID- Constitutional:       Appearance: He is well-developed. HENT:      Head: Normocephalic and atraumatic. Eyes:      General: Lids are normal.      Extraocular Movements: Extraocular movements intact.    Cardiovascular:      Rate and Rhythm: Normal rate calcification of the aortic arch and thoracic aorta. LUNGS:   Vague ill-defined air space opacity is seen within the right lower lobe. BONES:   There is degenerative disease of the thoracic spine.          CONCLUSION:   Right lower lobe airspace opacity sug

## 2021-08-09 NOTE — TELEPHONE ENCOUNTER
Respiratory infection triage:    Fever: 101 F Saturday  []  No fever  [x]  Fever>100.4    Cough: Cough since Wednesday, productive gray color  [] Tight cough  [] Cough with exertion  [] Dry cough  [x] Sputum production, Color: grey     Breathing:  [] Mild

## 2021-08-09 NOTE — ED INITIAL ASSESSMENT (HPI)
Patient + covid. t max 101 on Saturday. With cough. spo2 wnl at home. Spoke to his md office who recommended evaluation here at the ic.

## 2021-08-09 NOTE — TELEPHONE ENCOUNTER
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

## 2021-08-19 RX ORDER — ACYCLOVIR 400 MG/1
400 TABLET ORAL 3 TIMES DAILY
Qty: 15 TABLET | Refills: 1 | Status: SHIPPED | OUTPATIENT
Start: 2021-08-19

## 2021-08-19 NOTE — TELEPHONE ENCOUNTER
To Dr. Tabby Pruitt to please advise in Dr. Cyndie Nixon absence----  Pt calling with 2 week update--reports feeling well since COVID diagnosis on 8/9/21. Pt denies any breathing problems- no SOB or wheezing. No fever, chills, body ache or cough.  Pt reports he is not

## 2021-08-19 NOTE — TELEPHONE ENCOUNTER
Can treat cold sore with acyclovir 400 mg 3 times daily x5 days  Call if no significant improvement.

## 2021-08-19 NOTE — TELEPHONE ENCOUNTER
Pt called with update as per Dr August Homes positive for COVID three weeks and pneumonia  Pt has questions about follow up xray and also patient is experiencing canker sores in his mouth for the last three days  Please call pt to discuss/advise  Taske

## 2021-08-20 ENCOUNTER — TELEPHONE (OUTPATIENT)
Dept: INTERNAL MEDICINE CLINIC | Facility: CLINIC | Age: 81
End: 2021-08-20

## 2021-08-20 NOTE — TELEPHONE ENCOUNTER
Patient's wife Wandy tSeele is returning nurse call (not available) phone # was verified [phone]

## 2021-08-20 NOTE — TELEPHONE ENCOUNTER
[facility] is calling because pt. has cold sores that keep erupting in his mouth. It seems like he is getting more cold sores. He was prescribed Acyclovir & and has been rinsing his mouth with an aspirin and water or peroxide and water.  Pt. wants to be seen to

## 2021-08-21 ENCOUNTER — HOSPITAL ENCOUNTER (OUTPATIENT)
Age: 81
Discharge: HOME | End: 2021-08-21
Attending: EMERGENCY MEDICINE
Payer: MEDICARE

## 2021-08-21 VITALS
TEMPERATURE: 98 F | RESPIRATION RATE: 18 BRPM | SYSTOLIC BLOOD PRESSURE: 107 MMHG | HEART RATE: 92 BPM | OXYGEN SATURATION: 96 % | DIASTOLIC BLOOD PRESSURE: 71 MMHG

## 2021-08-21 DIAGNOSIS — B37.0 THRUSH, ORAL: Primary | ICD-10-CM

## 2021-08-21 LAB — S PYO AG THROAT QL: NEGATIVE

## 2021-08-21 PROCEDURE — 99213 OFFICE O/P EST LOW 20 MIN: CPT

## 2021-08-21 PROCEDURE — 87880 STREP A ASSAY W/OPTIC: CPT

## 2021-08-21 NOTE — ED PROVIDER NOTES
Patient Seen in: [facility]      History   Patient presents with:  Mouth/Lip Problem    Stated Complaint: mouth sores    HPI/Subjective:   HPI    The patient is an 24-year-old male with past history of coronary artery disease, status post singh /71   Pulse 92   Resp 18   Temp 97.5 °F (36.4 °C)   Temp src Temporal   SpO2 96 %   O2 Device None (Room air)       Current:/71   Pulse 92   Temp 97.5 °F (36.4 °C) (Temporal)   Resp 18   SpO2 96%         Physical Exam    Constitutional: Well- 10:54 am    Follow-up:  Ty Tatum MD  [address] [id number]  [phone]    In 2 days  If symptoms worsen          Medications Prescribed:  Current Discharge Medication List    START taking these medications    nystatin [id number] U

## 2021-08-21 NOTE — ED INITIAL ASSESSMENT (HPI)
Dx with covid 8/9/21. C/o \"mouth sores\" to lips and inside mouth for 10 days. Painful to eat/drink. Taking zovirax without relief.

## 2021-08-23 NOTE — TELEPHONE ENCOUNTER
To Dr. Rylee Lau to please advise----    Spoke to patient, reports he has been having sores in his mouth/throat since last Wednesday. He was prescribed acyclovir by Dr. Cassidy Fuentes. He completed the acyclovir and symptoms worsened.  He went to [facility] on Saturday (8/21) and was p

## 2021-08-23 NOTE — TELEPHONE ENCOUNTER
Finished medication prescribed from   Mouth sores are only slightly better  Pt wanted to schedule an appt with Dr Johns Later today, no openings,   Requests call back to advise     [phone]

## 2021-08-25 ENCOUNTER — TELEPHONE (OUTPATIENT)
Dept: INTERNAL MEDICINE CLINIC | Facility: CLINIC | Age: 81
End: 2021-08-25

## 2021-08-25 NOTE — TELEPHONE ENCOUNTER
Pt is calling and states that he has congestion on only side of his nose .   This is common for him and the doctor prescribes him something for the congestion an antibiotic     Please call and advise Rhofade Pregnancy And Lactation Text: This medication has not been assigned a Pregnancy Risk Category. It is unknown if the medication is excreted in breast milk.

## 2021-08-26 NOTE — TELEPHONE ENCOUNTER
Imp- thrush   Rhinitis    Rec- extend nystatin suspension 5 mL po QID #140 mL    Cont Flonase NS 1 sp EN BID    Pt called; ERx sent

## 2021-08-29 ENCOUNTER — HOSPITAL ENCOUNTER (OUTPATIENT)
Age: 81
Discharge: HOME | End: 2021-08-29
Attending: EMERGENCY MEDICINE
Payer: MEDICARE

## 2021-08-29 VITALS
DIASTOLIC BLOOD PRESSURE: 58 MMHG | TEMPERATURE: 98 F | RESPIRATION RATE: 18 BRPM | SYSTOLIC BLOOD PRESSURE: 104 MMHG | HEART RATE: 77 BPM | OXYGEN SATURATION: 96 %

## 2021-08-29 DIAGNOSIS — R04.0 EPISTAXIS: Primary | ICD-10-CM

## 2021-08-29 PROCEDURE — 99212 OFFICE O/P EST SF 10 MIN: CPT

## 2021-08-29 NOTE — ED INITIAL ASSESSMENT (HPI)
PATIENT ARRIVED AMBULATORY TO ROOM. PATIENT WAS IN THE IC ON 8/21 AND DX WITH THRUSH. PATIENT STATES SINCE THEN HE HAS HAD ISSUES WITH NOSE BLEEDS. NO ACTIVE BLEEDING AT THIS TIME.  NO PAIN

## 2021-08-31 ENCOUNTER — OFFICE VISIT (OUTPATIENT)
Dept: OTOLARYNGOLOGY | Facility: CLINIC | Age: 81
End: 2021-08-31
Payer: MEDICARE

## 2021-08-31 VITALS — WEIGHT: 205 LBS | HEIGHT: 66 IN | BODY MASS INDEX: 32.95 KG/M2

## 2021-08-31 DIAGNOSIS — R04.0 NOSEBLEED: Primary | ICD-10-CM

## 2021-08-31 PROCEDURE — 99203 OFFICE O/P NEW LOW 30 MIN: CPT | Performed by: OTOLARYNGOLOGY

## 2021-08-31 PROCEDURE — 30901 CONTROL OF NOSEBLEED: CPT | Performed by: OTOLARYNGOLOGY

## 2021-08-31 PROCEDURE — 3008F BODY MASS INDEX DOCD: CPT | Performed by: OTOLARYNGOLOGY

## 2021-08-31 RX ORDER — MONTELUKAST SODIUM 10 MG/1
10 TABLET ORAL NIGHTLY
Qty: 30 TABLET | Refills: 3 | Status: SHIPPED | OUTPATIENT
Start: 2021-08-31 | End: 2021-12-27

## 2021-08-31 RX ORDER — LORATADINE 10 MG/1
10 TABLET ORAL DAILY
Qty: 30 TABLET | Refills: 3 | Status: SHIPPED | OUTPATIENT
Start: 2021-08-31 | End: 2021-09-02

## 2021-09-01 ENCOUNTER — TELEPHONE (OUTPATIENT)
Dept: OTOLARYNGOLOGY | Facility: CLINIC | Age: 81
End: 2021-09-01

## 2021-09-01 NOTE — PROGRESS NOTES
Chapito Maxwell is a [de-identified]year old male.   Patient presents with:  Nose Bleed: pt states he has been having on and off nose bleeds for a few weeks now and his last nose bleed was last night  Sinus Problem: pt is having alot of congestion and runny nose and w Surgical History:   Procedure Laterality Date   • ELECTROCARDIOGRAM, COMPLETE      Scanned to media tab - DOS 10-   • OTHER Bilateral     carpal tunnel releases   • OTHER Left     left knee replacement   • PT W/ CORONARY ARTERY STENT  2010         R External nose - Normal. Lips/teeth/gums - Normal. Tonsils - Normal. Oropharynx - Normal.   Nose/Mouth/Throat Normal Nares - Right: Normal Left: Normal. Septum -Normal  Turbinates - Right: Normal, Left: Normal.Bilateral septal vessels.     Procedures:  Contr Disp: 15 tablet, Rfl: 1  •  acyclovir 400 MG Oral Tab, Take 1 tablet (400 mg total) by mouth 3 (three) times daily. (Patient not taking: Reported on 8/31/2021 ), Disp: 30 tablet, Rfl: 0  ASSESSMENT AND PLAN    1. Nosebleed  Righh septal vessels cauterized.

## 2021-09-02 RX ORDER — LORATADINE 10 MG/1
10 TABLET ORAL DAILY
Qty: 30 TABLET | Refills: 3 | Status: SHIPPED | OUTPATIENT
Start: 2021-09-02

## 2021-09-02 NOTE — TELEPHONE ENCOUNTER
Per pt spoke with pharmacy today, states loratadine prescription has not been received, asking to resend.

## 2021-09-02 NOTE — TELEPHONE ENCOUNTER
Per note, patient was to start loratadine and singulair. Both were sent to the pharmacy. no discrete location documentation necessary

## 2021-09-06 NOTE — ED PROVIDER NOTES
Patient Seen in: [facility]      History   Patient presents with:  Epistaxis    Stated Complaint: constant nose bleed    HPI/Subjective:   HPI    Intermittent nose bleeds for over one week. Currently not bleeding. No URI Symptoms. No trauma. Head: Normocephalic and atraumatic. Nose:      Comments: No active bleeding. No visible bleeding site. Eyes:      Conjunctiva/sclera: Conjunctivae normal.      Pupils: Pupils are equal, round, and reactive to light.    Cardiovascular:      Rate

## 2021-12-27 RX ORDER — MONTELUKAST SODIUM 10 MG/1
TABLET ORAL
Qty: 30 TABLET | Refills: 0 | Status: SHIPPED | OUTPATIENT
Start: 2021-12-27 | End: 2022-01-29

## 2022-01-12 ENCOUNTER — OFFICE VISIT (OUTPATIENT)
Dept: INTERNAL MEDICINE CLINIC | Facility: CLINIC | Age: 82
End: 2022-01-12
Payer: MEDICARE

## 2022-01-12 VITALS
HEIGHT: 66 IN | HEART RATE: 87 BPM | WEIGHT: 213.38 LBS | DIASTOLIC BLOOD PRESSURE: 62 MMHG | OXYGEN SATURATION: 97 % | SYSTOLIC BLOOD PRESSURE: 114 MMHG | BODY MASS INDEX: 34.29 KG/M2

## 2022-01-12 DIAGNOSIS — K40.90 RIGHT INGUINAL HERNIA: Primary | ICD-10-CM

## 2022-01-12 DIAGNOSIS — E78.00 HYPERCHOLESTEREMIA: ICD-10-CM

## 2022-01-12 DIAGNOSIS — I10 BENIGN HYPERTENSION: ICD-10-CM

## 2022-01-12 DIAGNOSIS — M19.031 PRIMARY OSTEOARTHRITIS OF RIGHT WRIST: ICD-10-CM

## 2022-01-12 DIAGNOSIS — I25.10 CORONARY ARTERY DISEASE INVOLVING NATIVE CORONARY ARTERY OF NATIVE HEART WITHOUT ANGINA PECTORIS: ICD-10-CM

## 2022-01-12 PROCEDURE — 3078F DIAST BP <80 MM HG: CPT | Performed by: INTERNAL MEDICINE

## 2022-01-12 PROCEDURE — 3074F SYST BP LT 130 MM HG: CPT | Performed by: INTERNAL MEDICINE

## 2022-01-12 PROCEDURE — 3008F BODY MASS INDEX DOCD: CPT | Performed by: INTERNAL MEDICINE

## 2022-01-12 PROCEDURE — 99214 OFFICE O/P EST MOD 30 MIN: CPT | Performed by: INTERNAL MEDICINE

## 2022-01-12 NOTE — PROGRESS NOTES
Anderson Smith is a 80year old male.     HPI:   Patient presents with:  Groin Pain      81 y/o M with c/o reducible bulge to right groin; no N/V; no constipation; no diarrhea; no melena; no hematochezia; c/o +Swelling to right [facility] right wrist route  every day     • loratadine 10 MG Oral Tab Take 1 tablet (10 mg total) by mouth daily. (Patient not taking: Reported on 1/12/2022) 30 tablet 3   • acyclovir 400 MG Oral Tab Take 1 tablet (400 mg total) by mouth 3 (three) times daily.  (Patient not rj Advil 400 mg po beena  -pt referred to orthopedics Dr Barbara Delgado at [facility]    Mouth sores  Possible herpetic; takes acyclovir 400 mg po TID x7d prn outbreaks     Health Maintenance  Last colonoscopy in 2000 at [facility] by Dr Padmini Gregory; repeat colonoscopy

## 2022-01-28 NOTE — TELEPHONE ENCOUNTER
Patient requesting medication refill. States he needs it right away since he is out of medication.  Please advise       MONTELUKAST 10 MG Oral Tab

## 2022-01-29 RX ORDER — MONTELUKAST SODIUM 10 MG/1
TABLET ORAL
Qty: 30 TABLET | Refills: 3 | Status: SHIPPED | OUTPATIENT
Start: 2022-01-29

## 2022-02-16 ENCOUNTER — APPOINTMENT (OUTPATIENT)
Dept: MRI IMAGING | Facility: HOSPITAL | Age: 82
End: 2022-02-16
Attending: EMERGENCY MEDICINE
Payer: MEDICARE

## 2022-02-16 ENCOUNTER — HOSPITAL ENCOUNTER (EMERGENCY)
Facility: HOSPITAL | Age: 82
Discharge: HOME | End: 2022-02-17
Attending: EMERGENCY MEDICINE
Payer: MEDICARE

## 2022-02-16 DIAGNOSIS — H53.131 ACUTE LOSS OF VISION, RIGHT: Primary | ICD-10-CM

## 2022-02-16 LAB
ANION GAP SERPL CALC-SCNC: 10 MMOL/L (ref 0–18)
BASOPHILS # BLD AUTO: 0.02 X10(3) UL (ref 0–0.2)
BASOPHILS NFR BLD AUTO: 0.3 %
BUN BLD-MCNC: 37 MG/DL (ref 7–18)
BUN/CREAT SERPL: 23.4 (ref 10–20)
CALCIUM BLD-MCNC: 8.7 MG/DL (ref 8.5–10.1)
CHLORIDE SERPL-SCNC: 107 MMOL/L (ref 98–112)
CO2 SERPL-SCNC: 23 MMOL/L (ref 21–32)
CREAT BLD-MCNC: 1.58 MG/DL
CRP SERPL-MCNC: <0.29 MG/DL (ref ?–0.3)
DEPRECATED RDW RBC AUTO: 42.8 FL (ref 35.1–46.3)
EOSINOPHIL # BLD AUTO: 0.52 X10(3) UL (ref 0–0.7)
EOSINOPHIL NFR BLD AUTO: 7.7 %
ERYTHROCYTE [DISTWIDTH] IN BLOOD BY AUTOMATED COUNT: 12.8 % (ref 11–15)
ERYTHROCYTE [SEDIMENTATION RATE] IN BLOOD: 4 MM/HR
GLUCOSE BLD-MCNC: 112 MG/DL (ref 70–99)
HCT VFR BLD AUTO: 42.6 %
HGB BLD-MCNC: 14.4 G/DL
IMM GRANULOCYTES # BLD AUTO: 0.04 X10(3) UL (ref 0–1)
IMM GRANULOCYTES NFR BLD: 0.6 %
LYMPHOCYTES # BLD AUTO: 1.5 X10(3) UL (ref 1–4)
LYMPHOCYTES NFR BLD AUTO: 22.1 %
MCH RBC QN AUTO: 31.1 PG (ref 26–34)
MCHC RBC AUTO-ENTMCNC: 33.8 G/DL (ref 31–37)
MCV RBC AUTO: 92 FL
MONOCYTES # BLD AUTO: 0.78 X10(3) UL (ref 0.1–1)
MONOCYTES NFR BLD AUTO: 11.5 %
NEUTROPHILS # BLD AUTO: 3.92 X10 (3) UL (ref 1.5–7.7)
NEUTROPHILS # BLD AUTO: 3.92 X10(3) UL (ref 1.5–7.7)
NEUTROPHILS NFR BLD AUTO: 57.8 %
OSMOLALITY SERPL CALC.SUM OF ELEC: 299 MOSM/KG (ref 275–295)
PLATELET # BLD AUTO: 232 10(3)UL (ref 150–450)
POTASSIUM SERPL-SCNC: 3.7 MMOL/L (ref 3.5–5.1)
RBC # BLD AUTO: 4.63 X10(6)UL
SODIUM SERPL-SCNC: 140 MMOL/L (ref 136–145)
WBC # BLD AUTO: 6.8 X10(3) UL (ref 4–11)

## 2022-02-16 PROCEDURE — 86140 C-REACTIVE PROTEIN: CPT | Performed by: EMERGENCY MEDICINE

## 2022-02-16 PROCEDURE — 36415 COLL VENOUS BLD VENIPUNCTURE: CPT

## 2022-02-16 PROCEDURE — 93005 ELECTROCARDIOGRAM TRACING: CPT

## 2022-02-16 PROCEDURE — 85025 COMPLETE CBC W/AUTO DIFF WBC: CPT | Performed by: EMERGENCY MEDICINE

## 2022-02-16 PROCEDURE — 93010 ELECTROCARDIOGRAM REPORT: CPT | Performed by: EMERGENCY MEDICINE

## 2022-02-16 PROCEDURE — 99285 EMERGENCY DEPT VISIT HI MDM: CPT

## 2022-02-16 PROCEDURE — 80048 BASIC METABOLIC PNL TOTAL CA: CPT | Performed by: EMERGENCY MEDICINE

## 2022-02-16 PROCEDURE — 85652 RBC SED RATE AUTOMATED: CPT | Performed by: EMERGENCY MEDICINE

## 2022-02-16 PROCEDURE — 70551 MRI BRAIN STEM W/O DYE: CPT | Performed by: EMERGENCY MEDICINE

## 2022-02-17 ENCOUNTER — APPOINTMENT (OUTPATIENT)
Dept: CT IMAGING | Facility: HOSPITAL | Age: 82
End: 2022-02-17
Attending: EMERGENCY MEDICINE
Payer: MEDICARE

## 2022-02-17 VITALS
DIASTOLIC BLOOD PRESSURE: 71 MMHG | RESPIRATION RATE: 16 BRPM | WEIGHT: 210 LBS | HEART RATE: 77 BPM | OXYGEN SATURATION: 96 % | TEMPERATURE: 97 F | SYSTOLIC BLOOD PRESSURE: 110 MMHG | BODY MASS INDEX: 31.1 KG/M2 | HEIGHT: 69 IN

## 2022-02-17 LAB — SARS-COV-2 RNA RESP QL NAA+PROBE: NOT DETECTED

## 2022-02-17 PROCEDURE — 70498 CT ANGIOGRAPHY NECK: CPT | Performed by: EMERGENCY MEDICINE

## 2022-02-17 PROCEDURE — 70496 CT ANGIOGRAPHY HEAD: CPT | Performed by: EMERGENCY MEDICINE

## 2022-02-17 RX ORDER — ACETAMINOPHEN 500 MG
1000 TABLET ORAL ONCE
Status: COMPLETED | OUTPATIENT
Start: 2022-02-17 | End: 2022-02-17

## 2022-02-17 NOTE — ED INITIAL ASSESSMENT (HPI)
Pt ambulatory to ed for loss of vision to r eye x10m pta. Pt sts he was lying down in bed and r eye just went dark. Pt sts he had detatched retinal repair x2y ago. No other neurological deficits.

## 2022-02-18 ENCOUNTER — TELEPHONE (OUTPATIENT)
Dept: INTERNAL MEDICINE CLINIC | Facility: CLINIC | Age: 82
End: 2022-02-18

## 2022-02-18 ENCOUNTER — LAB ENCOUNTER (OUTPATIENT)
Dept: LAB | Facility: HOSPITAL | Age: 82
End: 2022-02-18
Attending: INTERNAL MEDICINE
Payer: MEDICARE

## 2022-02-18 DIAGNOSIS — I63.9 IMPENDING CEREBROVASCULAR ACCIDENT (HCC): Primary | ICD-10-CM

## 2022-02-18 DIAGNOSIS — I63.9 CEREBROVASCULAR ACCIDENT (CVA), UNSPECIFIED MECHANISM (HCC): ICD-10-CM

## 2022-02-18 LAB
CHOLEST SERPL-MCNC: 146 MG/DL (ref ?–200)
EST. AVERAGE GLUCOSE BLD GHB EST-MCNC: 120 MG/DL (ref 68–126)
FASTING PATIENT LIPID ANSWER: YES
HBA1C MFR BLD: 5.8 % (ref ?–5.7)
HDLC SERPL-MCNC: 52 MG/DL (ref 40–59)
LDLC SERPL CALC-MCNC: 56 MG/DL (ref ?–100)
NONHDLC SERPL-MCNC: 94 MG/DL (ref ?–130)
TRIGL SERPL-MCNC: 243 MG/DL (ref 30–149)
VLDLC SERPL CALC-MCNC: 35 MG/DL (ref 0–30)

## 2022-02-18 PROCEDURE — 83036 HEMOGLOBIN GLYCOSYLATED A1C: CPT

## 2022-02-18 PROCEDURE — 80061 LIPID PANEL: CPT

## 2022-02-18 PROCEDURE — 36415 COLL VENOUS BLD VENIPUNCTURE: CPT

## 2022-02-18 NOTE — TELEPHONE ENCOUNTER
I spoke to patient. 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 In order to prevent this from happening again, Dr Aron Milan wanted him to have a \"complete set of blood work\" and EKG. Claims Dr Aron Milan faxed over the labs he wanted him to have done, needs Dr Tanisha Mak to order them. Reviewed with patient that he had EKG and blood tests performed on 2/16 in ER.    Patient asking if this was the complete set of blood work that Dr Aron Milan wanted    To Dr Juanita Coffman

## 2022-02-18 NOTE — TELEPHONE ENCOUNTER
Pt called  Was unable to keep appt yesterday at 3:15 that was scheduled by Dr Cleavon Dance office  Is requesting lab orders and EKG order  This may have been requested by Dr Cleavon Dance office in notes faxed to Dr Harrington Simmonds yesterday prior to the scheduled 3:15 appt   Tasked to nursing

## 2022-02-18 NOTE — TELEPHONE ENCOUNTER
\"Yes\"; all the labs Dr Deshaun Mcpherson requested were performed in ER; I reviewed the lab results; no urgent abnormalities; please call pt    Component      Latest Ref Rng & Units 2/16/2022   WBC      4.0 - 11.0 x10(3) uL 6.8   RBC      3.80 - 5.80 x10(6)uL 4.63   Hemoglobin      13.0 - 17.5 g/dL 14.4   Hematocrit      39.0 - 53.0 % 42.6   MCV      80.0 - 100.0 fL 92.0   MCH      26.0 - 34.0 pg 31.1   MCHC      31.0 - 37.0 g/dL 33.8   RDW-SD      35.1 - 46.3 fL 42.8   RDW      11.0 - 15.0 % 12.8   Platelet Count      338.3 - 450.0 10(3)uL 232.0   Prelim Neutrophil Abs      1.50 - 7.70 x10 (3) uL 3.92   Neutrophils Absolute      1.50 - 7.70 x10(3) uL 3.92   Lymphocytes Absolute      1.00 - 4.00 x10(3) uL 1.50   Monocytes Absolute      0.10 - 1.00 x10(3) uL 0.78   Eosinophils Absolute      0.00 - 0.70 x10(3) uL 0.52   Basophils Absolute      0.00 - 0.20 x10(3) uL 0.02   Immature Granulocyte Absolute      0.00 - 1.00 x10(3) uL 0.04   Neutrophils %      % 57.8   Lymphocytes %      % 22.1   Monocytes %      % 11.5   Eosinophils %      % 7.7   Basophils %      % 0.3   Immature Granulocyte %      % 0.6   Glucose      70 - 99 mg/dL 112 (H)   Sodium      136 - 145 mmol/L 140   Potassium      3.5 - 5.1 mmol/L 3.7   Chloride      98 - 112 mmol/L 107   Carbon Dioxide, Total      21.0 - 32.0 mmol/L 23.0   ANION GAP      0 - 18 mmol/L 10   BUN      7 - 18 mg/dL 37 (H)   CREATININE      0.70 - 1.30 mg/dL 1.58 (H)   BUN/CREATININE RATIO      10.0 - 20.0 23.4 (H)   CALCIUM      8.5 - 10.1 mg/dL 8.7   CALCULATED OSMOLALITY      275 - 295 mOsm/kg 299 (H)   eGFR NON-AFR.  AMERICAN      >=60 40 (L)   eGFR       >=60 47 (L)   SED RATE      0 - 20 mm/Hr 4   C-REACTIVE PROTEIN      <0.30 mg/dL <0.29

## 2022-06-03 RX ORDER — MONTELUKAST SODIUM 10 MG/1
TABLET ORAL
Qty: 90 TABLET | Refills: 1 | Status: SHIPPED | OUTPATIENT
Start: 2022-06-03

## 2022-06-06 ENCOUNTER — OFFICE VISIT (OUTPATIENT)
Dept: INTERNAL MEDICINE CLINIC | Facility: CLINIC | Age: 82
End: 2022-06-06
Payer: MEDICARE

## 2022-06-06 VITALS
DIASTOLIC BLOOD PRESSURE: 76 MMHG | HEART RATE: 95 BPM | SYSTOLIC BLOOD PRESSURE: 120 MMHG | WEIGHT: 198 LBS | OXYGEN SATURATION: 96 % | BODY MASS INDEX: 29.33 KG/M2 | TEMPERATURE: 98 F | HEIGHT: 69 IN

## 2022-06-06 DIAGNOSIS — Z86.16 HISTORY OF COVID-19: ICD-10-CM

## 2022-06-06 DIAGNOSIS — M19.031 PRIMARY OSTEOARTHRITIS OF RIGHT WRIST: ICD-10-CM

## 2022-06-06 DIAGNOSIS — E78.00 HYPERCHOLESTEREMIA: ICD-10-CM

## 2022-06-06 DIAGNOSIS — H34.11 CENTRAL RETINAL ARTERY OCCLUSION OF RIGHT EYE: ICD-10-CM

## 2022-06-06 DIAGNOSIS — Z00.00 PHYSICAL EXAM, ANNUAL: Primary | ICD-10-CM

## 2022-06-06 DIAGNOSIS — J30.9 ALLERGIC RHINITIS, UNSPECIFIED SEASONALITY, UNSPECIFIED TRIGGER: ICD-10-CM

## 2022-06-06 DIAGNOSIS — Z87.19 HISTORY OF INGUINAL HERNIA REPAIR: ICD-10-CM

## 2022-06-06 DIAGNOSIS — Z98.890 HISTORY OF INGUINAL HERNIA REPAIR: ICD-10-CM

## 2022-06-06 DIAGNOSIS — N18.30 STAGE 3 CHRONIC KIDNEY DISEASE, UNSPECIFIED WHETHER STAGE 3A OR 3B CKD (HCC): ICD-10-CM

## 2022-06-06 DIAGNOSIS — M17.12 PRIMARY OSTEOARTHRITIS OF LEFT KNEE: ICD-10-CM

## 2022-06-06 DIAGNOSIS — I10 BENIGN HYPERTENSION: ICD-10-CM

## 2022-06-06 DIAGNOSIS — Z12.5 SCREENING PSA (PROSTATE SPECIFIC ANTIGEN): ICD-10-CM

## 2022-06-06 DIAGNOSIS — I25.10 CORONARY ARTERY DISEASE INVOLVING NATIVE CORONARY ARTERY OF NATIVE HEART WITHOUT ANGINA PECTORIS: ICD-10-CM

## 2022-06-06 DIAGNOSIS — K13.79 MOUTH SORES: ICD-10-CM

## 2022-06-06 DIAGNOSIS — I65.21 STENOSIS OF RIGHT CAROTID ARTERY: ICD-10-CM

## 2022-06-06 DIAGNOSIS — H40.9 GLAUCOMA OF RIGHT EYE, UNSPECIFIED GLAUCOMA TYPE: ICD-10-CM

## 2022-06-06 RX ORDER — LISINOPRIL 10 MG/1
10 TABLET ORAL DAILY
Refills: 0 | COMMUNITY
Start: 2022-06-06

## 2022-06-06 RX ORDER — CARVEDILOL 3.12 MG/1
3.12 TABLET ORAL 2 TIMES DAILY WITH MEALS
Refills: 0 | COMMUNITY
Start: 2022-06-06

## 2022-06-06 RX ORDER — ATORVASTATIN CALCIUM 40 MG/1
40 TABLET, FILM COATED ORAL NIGHTLY
Refills: 0 | COMMUNITY
Start: 2022-06-06

## 2022-11-11 ENCOUNTER — OFFICE VISIT (OUTPATIENT)
Dept: OTOLARYNGOLOGY | Facility: CLINIC | Age: 82
End: 2022-11-11
Payer: MEDICARE

## 2022-11-11 VITALS — BODY MASS INDEX: 29.33 KG/M2 | WEIGHT: 198 LBS | TEMPERATURE: 97 F | HEIGHT: 69 IN

## 2022-11-11 DIAGNOSIS — H61.23 BILATERAL IMPACTED CERUMEN: Primary | ICD-10-CM

## 2022-11-11 PROCEDURE — 3008F BODY MASS INDEX DOCD: CPT | Performed by: STUDENT IN AN ORGANIZED HEALTH CARE EDUCATION/TRAINING PROGRAM

## 2022-11-11 PROCEDURE — 69210 REMOVE IMPACTED EAR WAX UNI: CPT | Performed by: STUDENT IN AN ORGANIZED HEALTH CARE EDUCATION/TRAINING PROGRAM

## 2022-11-28 ENCOUNTER — TELEPHONE (OUTPATIENT)
Dept: INTERNAL MEDICINE CLINIC | Facility: CLINIC | Age: 82
End: 2022-11-28

## 2022-11-28 RX ORDER — TADALAFIL 10 MG/1
10 TABLET ORAL
Qty: 10 TABLET | Refills: 5 | Status: SHIPPED | OUTPATIENT
Start: 2022-11-28

## 2022-11-28 NOTE — TELEPHONE ENCOUNTER
Pt requests call back from Dr Stephon Echevarria to discuss new RX for ED issues     [phone] until 4 pm, home # after [phone]

## 2022-11-30 RX ORDER — MONTELUKAST SODIUM 10 MG/1
TABLET ORAL
Qty: 90 TABLET | Refills: 0 | Status: SHIPPED | OUTPATIENT
Start: 2022-11-30

## 2023-02-24 RX ORDER — MONTELUKAST SODIUM 10 MG/1
TABLET ORAL
Qty: 90 TABLET | Refills: 0 | Status: SHIPPED | OUTPATIENT
Start: 2023-02-24

## 2023-04-19 ENCOUNTER — TELEPHONE (OUTPATIENT)
Dept: INTERNAL MEDICINE CLINIC | Facility: CLINIC | Age: 83
End: 2023-04-19

## 2023-04-19 RX ORDER — AMOXICILLIN AND CLAVULANATE POTASSIUM 875; 125 MG/1; MG/1
1 TABLET, FILM COATED ORAL 2 TIMES DAILY
Qty: 20 TABLET | Refills: 0 | Status: SHIPPED | OUTPATIENT
Start: 2023-04-19 | End: 2023-04-29

## 2023-04-19 NOTE — TELEPHONE ENCOUNTER
Respiratory infection triage:    Fever:  [x]  No fever  []  Fever>100.4    Cough:  [] Tight cough  [] Cough with exertion  [] Dry cough  [x] Sputum production, Color: green   Breathing:  [] Mild shortness of breath interfering with activity  [] Wheezing  [] Pain with deep breathing  [] Using inhaler    Other symptoms:  [x] Sore throat  [] Difficulty swallowing  [x] Nasal drainage/congestion  [x] Sinus congestion/pressure  [] Ear pain  [] Body aches  [] Poor appetite  [] Loss of sense of smell   [] Loss of sense of taste  []Conjunctivitis? [x] Any recent travel? [place] Any sick contacts? [] Are you a healthcare worker? ADDITIONAL NOTES:  Please advise - called patient  who has SX for 1 month . Yesterday he had negative covid and Strep at an outpatient [facility] clinic. His voice is also hoarse. he was in [facility] , but states he had these symptoms before - woul dbe using [facility] in [place] - to DR. FELIX          Notified patient that we will route this message to the doctor and see what their recommendations would be. In the meantime, if anything worsens, they were advised to call back or seek emergent evaluation.

## 2023-04-19 NOTE — TELEPHONE ENCOUNTER
Pt would like to be seen today for a sore throat and sinus infection  Pt advises he had a negative Covid & Strep test yesterday at an out pt [facility] in [place]     Please call to triage for appt [phone]

## 2023-05-02 ENCOUNTER — TELEPHONE (OUTPATIENT)
Dept: SURGERY | Facility: CLINIC | Age: 83
End: 2023-05-02

## 2023-05-02 NOTE — TELEPHONE ENCOUNTER
Spoke with patient, assisted in rescheduling appointment. PT confirmed and verbalized understanding.      Future Appointments   Date Time Provider [place]   5/11/2023  3:30 PM James Medina MD [facility]

## 2023-05-02 NOTE — TELEPHONE ENCOUNTER
MD has emergency and has to leave office early. Patients appointment will need to be rescheduled. Spoke with patients spouse Jeff Salmeron. She states patient was not home and should try his work number. [phone]. Tried to reach patient multiple times and it continues to ring and then goes to busy signal. Called patient back and she provided me with his cell number [phone]. Tried to reach patient on mobile number and it went straight to voicemail.      FROY's if patient calls back, please transfer to [facility] ext: [phone]

## 2023-05-11 ENCOUNTER — OFFICE VISIT (OUTPATIENT)
Dept: SURGERY | Facility: CLINIC | Age: 83
End: 2023-05-11

## 2023-05-11 VITALS — SYSTOLIC BLOOD PRESSURE: 123 MMHG | DIASTOLIC BLOOD PRESSURE: 66 MMHG | HEART RATE: 63 BPM

## 2023-05-11 DIAGNOSIS — N52.9 ERECTILE DYSFUNCTION, UNSPECIFIED ERECTILE DYSFUNCTION TYPE: Primary | ICD-10-CM

## 2023-05-11 DIAGNOSIS — N40.1 BPH WITH OBSTRUCTION/LOWER URINARY TRACT SYMPTOMS: ICD-10-CM

## 2023-05-11 DIAGNOSIS — N13.8 BPH WITH OBSTRUCTION/LOWER URINARY TRACT SYMPTOMS: ICD-10-CM

## 2023-05-11 PROCEDURE — 3078F DIAST BP <80 MM HG: CPT | Performed by: UROLOGY

## 2023-05-11 PROCEDURE — 3074F SYST BP LT 130 MM HG: CPT | Performed by: UROLOGY

## 2023-05-11 PROCEDURE — 99204 OFFICE O/P NEW MOD 45 MIN: CPT | Performed by: UROLOGY

## 2023-05-31 RX ORDER — MONTELUKAST SODIUM 10 MG/1
TABLET ORAL
Qty: 90 TABLET | Refills: 0 | Status: SHIPPED | OUTPATIENT
Start: 2023-05-31

## 2023-06-12 RX ORDER — MONTELUKAST SODIUM 10 MG/1
TABLET ORAL
Qty: 90 TABLET | Refills: 0 | Status: SHIPPED | OUTPATIENT
Start: 2023-06-12

## 2023-09-07 RX ORDER — MONTELUKAST SODIUM 10 MG/1
TABLET ORAL
Qty: 90 TABLET | Refills: 0 | Status: SHIPPED | OUTPATIENT
Start: 2023-09-07

## 2023-09-08 RX ORDER — MONTELUKAST SODIUM 10 MG/1
TABLET ORAL
Qty: 90 TABLET | Refills: 0 | OUTPATIENT
Start: 2023-09-08

## 2023-09-08 NOTE — TELEPHONE ENCOUNTER
Duplicate refill request. Medication refilled per protocol on 9/7/23. Please see refill encounter 9/7/23.

## 2023-09-11 RX ORDER — MONTELUKAST SODIUM 10 MG/1
TABLET ORAL
Qty: 90 TABLET | Refills: 0 | OUTPATIENT
Start: 2023-09-11

## 2023-09-11 NOTE — TELEPHONE ENCOUNTER
Duplicate refill request . Medication refilled per protocol on 9/7/2023.  Please see refill encounter 9/7/23

## 2023-12-05 RX ORDER — MONTELUKAST SODIUM 10 MG/1
10 TABLET ORAL NIGHTLY
Qty: 30 TABLET | Refills: 0 | OUTPATIENT
Start: 2023-12-05

## 2023-12-05 NOTE — TELEPHONE ENCOUNTER
This refill request is being sent to the provider for the following reason:  [x]Patient has not had an appointment within the past 12 months but has made an appointment on: ___  []Medication is not within protocol  []Patient did not complete follow up recommendations  []Other: ___

## 2023-12-11 RX ORDER — MONTELUKAST SODIUM 10 MG/1
10 TABLET ORAL NIGHTLY
Qty: 30 TABLET | Refills: 0 | OUTPATIENT
Start: 2023-12-11

## 2023-12-13 RX ORDER — MONTELUKAST SODIUM 10 MG/1
TABLET ORAL
Qty: 90 TABLET | Refills: 0 | OUTPATIENT
Start: 2023-12-13

## 2023-12-13 NOTE — TELEPHONE ENCOUNTER
Called patient and spoke to his wife, Lyric Sunshine, regarding Montelukast refill request. Wife explained he does take his medicine everyday and when he asked the pharmacy to refill, he was told he would need a new prescription. Explained to her, it is the office policy to be seen yearly to continue refills. Wife understanding. She will give the message to her  along with the phone number.

## 2023-12-13 NOTE — TELEPHONE ENCOUNTER
Pt called to check on the refill for montelukast.  Pt has been out for 5 days.  Send to [facility].  Please call pt

## 2023-12-14 NOTE — TELEPHONE ENCOUNTER
Patient called back per pt will be out of town from 12/18 to 12/28 I was able to offer an opening for Friday 12/15 or Saturday with Dr Ann Avelar pt refused to schedule an appointment pt would like to know if he can get medication for the meantime. Please advise

## 2024-04-22 ENCOUNTER — OFFICE VISIT (OUTPATIENT)
Dept: INTERNAL MEDICINE CLINIC | Facility: CLINIC | Age: 84
End: 2024-04-22

## 2024-04-22 VITALS
OXYGEN SATURATION: 98 % | BODY MASS INDEX: 30.07 KG/M2 | HEIGHT: 69 IN | WEIGHT: 203 LBS | SYSTOLIC BLOOD PRESSURE: 110 MMHG | TEMPERATURE: 98 F | DIASTOLIC BLOOD PRESSURE: 62 MMHG | HEART RATE: 62 BPM

## 2024-04-22 DIAGNOSIS — K43.9 ABDOMINAL WALL HERNIA: Primary | ICD-10-CM

## 2024-04-22 DIAGNOSIS — C18.9 MALIGNANT NEOPLASM OF COLON, UNSPECIFIED PART OF COLON (HCC): ICD-10-CM

## 2024-04-22 DIAGNOSIS — N18.30 STAGE 3 CHRONIC KIDNEY DISEASE, UNSPECIFIED WHETHER STAGE 3A OR 3B CKD (HCC): ICD-10-CM

## 2024-04-22 DIAGNOSIS — I65.21 STENOSIS OF RIGHT CAROTID ARTERY: ICD-10-CM

## 2024-04-22 DIAGNOSIS — I25.10 CORONARY ARTERY DISEASE INVOLVING NATIVE CORONARY ARTERY OF NATIVE HEART WITHOUT ANGINA PECTORIS: ICD-10-CM

## 2024-04-22 PROCEDURE — 96127 BRIEF EMOTIONAL/BEHAV ASSMT: CPT | Performed by: INTERNAL MEDICINE

## 2024-04-22 PROCEDURE — 99214 OFFICE O/P EST MOD 30 MIN: CPT | Performed by: INTERNAL MEDICINE

## 2024-04-22 RX ORDER — DORZOLAMIDE HYDROCHLORIDE AND TIMOLOL MALEATE 20; 5 MG/ML; MG/ML
SOLUTION/ DROPS OPHTHALMIC
COMMUNITY
Start: 2024-02-12

## 2024-04-22 RX ORDER — LATANOPROST 50 UG/ML
SOLUTION/ DROPS OPHTHALMIC
COMMUNITY
Start: 2024-02-12

## 2024-04-22 NOTE — PROGRESS NOTES
Chin Mercer is a 83 year old male.    HPI:     Chief Complaint   Patient presents with    Checkup     Lump on abd, first noticed a couple month ago, no pain        84 y/o M with PMHx colon cancer, S/p Status post laparoscopic right hemicolectomy on 2023 for adenocarcinoma of the colon by surgeon Dr Jeff Witt at ; stage pT2N0.  He reports bulge to mid-left abdomen; no constipation; no diarrhea; no melena; no hematochezia        HISTORY:  Past Medical History:    Abnormal LFTs (liver function tests)    CAD (coronary artery disease)    s/p PCI/stent. Cardio Dr Artie Barker [facility]     Carpal tunnel syndrome    Colon cancer (HCC)    S/p Status post laparoscopic right hemicolectomy on 2023 for adenocarcinoma of the colon by surgeon Dr Jeff Witt at ; stage pT2N0    COVID    Fall    Other and unspecified hyperlipidemia    Unspecified essential hypertension      Past Surgical History:   Procedure Laterality Date    Electrocardiogram, complete      Scanned to media tab - DOS 10-    Other Bilateral     carpal tunnel releases    Other Left     left knee replacement    Pt w/ coronary artery stent  2010      Family History   Problem Relation Age of Onset    Diabetes Mother     Lipids Mother     Hypertension Mother     Heart Disease Brother         CAD    Prostate Cancer Brother       Social History:   Social History     Socioeconomic History    Marital status:    Tobacco Use    Smoking status: Former     Current packs/day: 0.00     Average packs/day: 0.5 packs/day for 9.0 years (4.5 ttl pk-yrs)     Types: Cigarettes     Start date: 1965     Quit date: 1974     Years since quittin.7    Smokeless tobacco: Never   Vaping Use    Vaping status: Never Used   Substance and Sexual Activity    Alcohol use: Yes     Alcohol/week: 0.0 standard drinks of alcohol     Comment: 7 glasses of wine weekly    Drug use: No   Other Topics Concern    Caffeine Concern Yes     Comment: Coffee 2  cups daily    Reaction to local anesthetic No     Social Determinants of Health     Financial Resource Strain: Low Risk  (2/17/2022)    Received from [employer]    Overall Financial Resource Strain (CARDIA)     Difficulty of Paying Living Expenses: Not very hard   Food Insecurity: Low Risk  (5/19/2023)    Received from [facility]    Food Insecurity     Have there been times that your food ran out, and you didn't have money to get more?: No     Are there times that you worry that this might happen?: No   Transportation Needs: Low Risk  (5/19/2023)    Received from [facility]    Transportation Needs     Do you have trouble getting transportation to medical appointments?: No   Physical Activity: Inactive (2/17/2022)    Received from [employer]    Exercise Vital Sign     Days of Exercise per Week: 0 days     Minutes of Exercise per Session: 0 min   Stress: No Stress Concern Present (2/17/2022)    Received from [employer]    [employer] [place] [employer] - Occupational Stress Questionnaire     Feeling of Stress : Not at all   Social Connections: Moderately Isolated (2/17/2022)    Received from [employer]    Social Connection and Isolation Panel [NHANES]     Frequency of Communication with Friends and Family: Twice a week     Frequency of Social Gatherings with Friends and Family: Once a week     Attends Gnosticist Services: Never     Active Member of Clubs or Organizations: No     Attends Club or Organization Meetings: Never     Marital Status:         Medications (Active prior to today's visit):  Current Outpatient Medications   Medication Sig Dispense Refill    dorzolamide-timolol 2-0.5 % Ophthalmic Solution       latanoprost 0.005 % Ophthalmic Solution       Tadalafil 10 MG Oral Tab Take 1 tablet (10 mg total) by mouth daily as needed for Erectile Dysfunction. 10 tablet 5    lisinopril 10 MG Oral Tab Take 1 tablet (10  mg total) by mouth daily.  0    carvedilol 3.125 MG Oral Tab Take 1 tablet (3.125 mg total) by mouth 2 (two) times daily with meals.  0    atorvastatin 40 MG Oral Tab Take 1 tablet (40 mg total) by mouth nightly.  0    loratadine 10 MG Oral Tab Take 1 tablet (10 mg total) by mouth daily. 30 tablet 3    aspirin 81 MG Oral Chew Tab Chew  by mouth. aspirin 81 mg effervescent tablet      MONTELUKAST 10 MG Oral Tab TAKE ONE TABLET BY MOUTH NIGHTLY (Patient not taking: Reported on 4/22/2024) 90 tablet 0       Allergies:  Allergies   Allergen Reactions    Nuts PAIN                 ROS:   Constitutional: no weight loss; no fatigue  Cardiovascular:  Negative for chest pain; negative palpitations  Respiratory:  Negative for cough, dyspnea and wheezing  Gastrointestinal:  Negative for abdominal pain, constipation, decreased appetite, diarrhea and vomiting; no melena or hematochezia  All other review of systems are negative.        PHYSICAL EXAM:   Blood pressure 110/62, pulse 62, temperature 97.5 °F (36.4 °C), temperature source Tympanic, height 5' 9\" (1.753 m), weight 203 lb (92.1 kg), SpO2 98%.  Constitutional: alert and oriented x3 in no acute distress  HEENT- EOMI, PERRL  Nose/Mouth/Throat: pharynx without erythema; no oral lesions  Neck/Thyroid: neck supple; no thyromegaly  Cardiovascular: RRR, S1, S2, no S3 or murmur  Respiratory: lungs without crackles or wheezes  Abdomen: normoactive bowel sounds, soft, non-tender and non-distended; +abdominal wall hernia to left of umbilical area; +reducible  Extremities: no clubbing, cyanosis or edema           ASSESSMENT/PLAN:   Abdominal wall hernia  To mid-left abdomen  -advisesurgical referral Dr Jeff Witt    Colon cancer  S/p Status post laparoscopic right hemicolectomy on 5/25/2023 for adenocarcinoma of the colon by surgeon Dr Jeff Witt at ; stage pT2N0; had seen oncologist Dr Dena Barker at time of surgery, though has had no follow-up since  -advise see  oncologist Dr Barker for surveillance recommendations    Health Maintenance  Last colonoscopy in 2000 at [facility] by Dr Ellis; repeat colonoscopy deferred due to age     Right central retinal artery occlusion  Occurred on 2/16/22; carotid doppler 35% MARIBELL stenosis; TTE at [facility] 2/187/22 LVEF 45-50% with negative bubble study; 30-day event monitor showed NSR with PACs and NSVT; and ELEANOR screen pending July 2022 at [facility]; right eye vision is absent, but can read with left eye; has resumed driving  -sees ophtho Dr Eldon Adler at [facility]     Glaucoma right eye  -cont pred acetate OD QID  -cont prostaglandin analog qhs OD  -sees ophtho Dr Kisha Gonzalez at [facility]     Carotid artery stenosis  CTA carotid arteries in Feb 2022 show 35% MARIBELL stenosis;     History of Right inguinal hernia repair  s/p repair in April 2022 by Dr Jose Mobley at [facility]     Right wrist osteoarthritis  XR right wrist in 2017 showed radiocarpal arthritis, including osteophytosis at the volar lip of   the radius. DISI deformity with dorsal tilt of the lunate.  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 Distal left main with 20 % stenosis, proximal LAD: There was a 0 % stenosis at the site of a prior stent. Mid LAD: There was a discrete 50 % stenosis. There  was RAMY grade 3 flow through the vessel (brisk flow). Proximal circumflex: There was a 30 % stenosis. Proximal RCA: There was a 30 % stenosis; medical management recommended by Dr Barker  -CATH on 3/15/22 at [facility] shows non-obstructive CAD (LM  normal, Distal LAD is small with focal 50% lesion, LCX normal and supplies two small OM branches, RCA with PL and PDA branches. 20-30% proximal RCA disease. )  -on Coreg 3.125 mg po BID, ASA 81 mg po qD  HTN (hypertension)  on Coreg 3.125 mg po BID, lisininopril 10 mg po qD ,check CBC, TSH  Hypercholesteremia  LDL 34 in March 2022 on atorvastatin 40 mg po qHS; Rx per Dr Barker  CKD  Creatinine 1.45 in March 2022     Allergic rhinitis   Using loratadine 10 mg po every day, and montelukast 10 mg po qD; does not like to use Flonase NS 2 sp EN qD;      PSA screening  PSA 0.1 in April 2018; would not check PSA due to age >70     History of COVID infection   In Aug 2021     RTC 6 mos  MWE 6/6/22     Spent 30 minutes obtaining history, evaluating patient, discussing treatment options, diet, exercise, review of available labs and radiology reports, and completing documentation.                   Orders This Visit:  No orders of the defined types were placed in this encounter.      Meds This Visit:  Requested Prescriptions      No prescriptions requested or ordered in this encounter       Imaging & Referrals:  None     4/22/2024  Terell Hernandez MD

## 2024-06-21 ENCOUNTER — TELEPHONE (OUTPATIENT)
Dept: INTERNAL MEDICINE CLINIC | Facility: CLINIC | Age: 84
End: 2024-06-21

## 2024-06-21 NOTE — TELEPHONE ENCOUNTER
Patient called to schedule Pre-Op appointment with Dr. Hernandez as he is having Surgery for Abdominal Hernia on Thursday 6/27/24 with Dr. Jeff Witt at [facility].   Patient stated he was just notified by surgeon's office that he needed Medical Clearance from Dr. Hernandez.   Patient is unable to come in today for appointment but is available on Monday 6/24/24. Dr. Hernandez does not have any openings. Please advise.   Dr. Jeff Witt at [facility]'s Phone #[phone], and Fax#[phone] (Attention Marylin)  Patient's #[phone] - Ok to leave detailed message.

## 2024-06-24 ENCOUNTER — OFFICE VISIT (OUTPATIENT)
Dept: INTERNAL MEDICINE CLINIC | Facility: CLINIC | Age: 84
End: 2024-06-24

## 2024-06-24 VITALS
SYSTOLIC BLOOD PRESSURE: 110 MMHG | HEART RATE: 64 BPM | BODY MASS INDEX: 29.47 KG/M2 | HEIGHT: 69 IN | DIASTOLIC BLOOD PRESSURE: 60 MMHG | TEMPERATURE: 98 F | WEIGHT: 199 LBS | OXYGEN SATURATION: 95 %

## 2024-06-24 DIAGNOSIS — C18.9 MALIGNANT NEOPLASM OF COLON, UNSPECIFIED PART OF COLON (HCC): ICD-10-CM

## 2024-06-24 DIAGNOSIS — K43.9 ABDOMINAL WALL HERNIA: Primary | ICD-10-CM

## 2024-06-24 DIAGNOSIS — I25.10 CORONARY ARTERY DISEASE INVOLVING NATIVE CORONARY ARTERY OF NATIVE HEART WITHOUT ANGINA PECTORIS: ICD-10-CM

## 2024-06-24 DIAGNOSIS — I65.21 STENOSIS OF RIGHT CAROTID ARTERY: ICD-10-CM

## 2024-06-24 DIAGNOSIS — I10 BENIGN HYPERTENSION: ICD-10-CM

## 2024-06-24 PROCEDURE — 99214 OFFICE O/P EST MOD 30 MIN: CPT | Performed by: INTERNAL MEDICINE

## 2024-06-24 PROCEDURE — G2211 COMPLEX E/M VISIT ADD ON: HCPCS | Performed by: INTERNAL MEDICINE

## 2024-06-24 PROCEDURE — 93000 ELECTROCARDIOGRAM COMPLETE: CPT | Performed by: INTERNAL MEDICINE

## 2024-06-24 NOTE — TELEPHONE ENCOUNTER
Patient returning our call.    2:00pm appointment was taken over the weekend.  Patient took 2:15pm appointment.    Printed message for Dr. Hernandez

## 2024-06-24 NOTE — TELEPHONE ENCOUNTER
Marylin/ESCOBAR called  Requests clearance/labs/OV notes from today's appointment are sent to fax# [phone]

## 2024-06-24 NOTE — PROGRESS NOTES
Chin Mercer is a 83 year old male.    HPI:     Chief Complaint   Patient presents with    Pre-Op Exam     Pt here for pre op exam for upcoming abdominal hernia surgery with  on 2024 ; fax:attention:[facility] [phone]       84 y/o M with abdominal wall hernia here for preoperative exam.  He awaits repair 24  at  [facility] by surgeon, Dr Jeff Witt. He denies constipation; no diarrhea; no melena; no hematochezia.  He denies CP; no SOB; no headaches; no palpitation. He plays golf once per week; works as ranger at [employer];         HISTORY:  Past Medical History:    Abnormal LFTs (liver function tests)    CAD (coronary artery disease)    s/p PCI/stent. Cardio Dr Artie Barker [facility]     Carpal tunnel syndrome    Colon cancer (HCC)    S/p Status post laparoscopic right hemicolectomy on 2023 for adenocarcinoma of the colon by surgeon Dr Jeff Witt at ; stage pT2N0    COVID    Fall    Other and unspecified hyperlipidemia    Unspecified essential hypertension      Past Surgical History:   Procedure Laterality Date    Electrocardiogram, complete      Scanned to media tab - DOS 10-    Other Bilateral     carpal tunnel releases    Other Left     left knee replacement    Pt w/ coronary artery stent  2010      Family History   Problem Relation Age of Onset    Diabetes Mother     Lipids Mother     Hypertension Mother     Heart Disease Brother         CAD    Prostate Cancer Brother       Social History:   Social History     Socioeconomic History    Marital status:    Tobacco Use    Smoking status: Former     Current packs/day: 0.00     Average packs/day: 0.5 packs/day for 9.0 years (4.5 ttl pk-yrs)     Types: Cigarettes     Start date: 1965     Quit date: 1974     Years since quittin.9    Smokeless tobacco: Never   Vaping Use    Vaping status: Never Used   Substance and Sexual Activity    Alcohol use: Yes     Alcohol/week: 0.0 standard drinks of alcohol      Comment: 7 glasses of wine weekly    Drug use: No   Other Topics Concern    Caffeine Concern Yes     Comment: Coffee 2 cups daily    Reaction to local anesthetic No     Social Determinants of Health     Financial Resource Strain: Low Risk  (2/17/2022)    Received from [employer]    Overall Financial Resource Strain (CARDIA)     Difficulty of Paying Living Expenses: Not very hard   Food Insecurity: Low Risk  (5/19/2023)    Received from [facility]    Food Insecurity     Have there been times that your food ran out, and you didn't have money to get more?: No     Are there times that you worry that this might happen?: No   Transportation Needs: Low Risk  (5/19/2023)    Received from [facility]    Transportation Needs     Do you have trouble getting transportation to medical appointments?: No   Physical Activity: Inactive (2/17/2022)    Received from [employer]    Exercise Vital Sign     Days of Exercise per Week: 0 days     Minutes of Exercise per Session: 0 min   Stress: No Stress Concern Present (2/17/2022)    Received from [employer]    [employer] [place] [employer]ational [employer] - Occupational Stress Questionnaire     Feeling of Stress : Not at all   Social Connections: Moderately Isolated (2/17/2022)    Received from [employer]    Social Connection and Isolation Panel [NHANES]     Frequency of Communication with Friends and Family: Twice a week     Frequency of Social Gatherings with Friends and Family: Once a week     Attends Protestant Services: Never     Active Member of Clubs or Organizations: No     Attends Club or Organization Meetings: Never     Marital Status:         Medications (Active prior to today's visit):  Current Outpatient Medications   Medication Sig Dispense Refill    dorzolamide-timolol 2-0.5 % Ophthalmic Solution       latanoprost 0.005 % Ophthalmic Solution       Tadalafil 10 MG Oral Tab Take 1 tablet (10  mg total) by mouth daily as needed for Erectile Dysfunction. 10 tablet 5    lisinopril 10 MG Oral Tab Take 1 tablet (10 mg total) by mouth daily.  0    carvedilol 3.125 MG Oral Tab Take 1 tablet (3.125 mg total) by mouth 2 (two) times daily with meals.  0    atorvastatin 40 MG Oral Tab Take 1 tablet (40 mg total) by mouth nightly.  0    loratadine 10 MG Oral Tab Take 1 tablet (10 mg total) by mouth daily. 30 tablet 3    aspirin 81 MG Oral Chew Tab Chew  by mouth. aspirin 81 mg effervescent tablet         Allergies:  Allergies   Allergen Reactions    Nuts PAIN               ROS:   Constitutional: no weight loss; no fatigue  ENMT:  Negative for ear drainage, hearing loss and nasal drainage  Eyes:  Negative for eye discharge and vision loss  Cardiovascular:  Negative for chest pain; negative palpitations  Respiratory:  Negative for cough, dyspnea and wheezing  Endocrine:  Negative for abnormal sleep patterns, increased activity, polydipsia and polyphagia  Gastrointestinal:  Negative for abdominal pain, constipation, decreased appetite, diarrhea and vomiting; no melena or hematochezia  Musculoskeletal:  Negative for arthralgias or myalgias  Genitourinary:  Negative for dysuria or polyuria  Hema/Lymph:  Negative for easy bleeding and easy bruising  Integumentary:  Negative for pruritus and rash  Neurological:  Negative for gait disturbance; negative for paresthesias   All other review of systems are negative.        PHYSICAL EXAM:   Blood pressure 110/60, pulse 64, temperature 97.9 °F (36.6 °C), height 5' 9\" (1.753 m), weight 199 lb (90.3 kg), SpO2 95%.  Constitutional: alert and oriented x3 in no acute distress  HEENT- EOMI, PERRL  Nose/Mouth/Throat: pharynx without erythema; no oral lesions  Neck/Thyroid: neck supple; no thyromegaly  Lymphatics: no lymphadenopathy of neck or groin  Cardiovascular: RRR, S1, S2, no S3 or murmur  Respiratory: lungs without crackles or wheezes  Abdomen: normoactive bowel sounds, soft,  non-tender and non-distended  Extremities: no clubbing, cyanosis or edema  Vascular: no carotid bruits; DP/PT 2/2  Musculoskeletal: Motor 5/5 upper and lower extremities  Neurological: cranial nerves II-XII intact; light touch and proprioception intact  Skin: no rash or ulcerations         ASSESSMENT/PLAN:   Abdominal wall hernia  To mid-left abdomen  -awaits repair 6/27/24  at [facility] by surgeon  Dr Jeff Witt  -EKG today shows NSR, LBBB; unchanged from 2022  -has good exercise tolerance; no angina or dyspnea  -Labs CBC, CMP 6/5/24 reviewed and are unremarkable  --the patient is judged to be low cardiac risk for planned procedure and may proceed as planned    Colon cancer  S/p Status post laparoscopic right hemicolectomy on 5/25/2023 for adenocarcinoma of the colon by surgeon Dr Jeff Witt at ; stage pT2N0; sees oncologist Dr Dena Barker;   -CEA on 5/19/2023 was 3.5; CEA 5.2 on 6/5/24; awaits CT A/P in July 2024, and awaits colonoscopy in Sept 2024 by GI Dr Haleigh Belcher at [facility] Maintenance  Last colonoscopy in 2000 at [place] by Dr Ellis; repeat colonoscopy deferred due to age     Right central retinal artery occlusion  Occurred on 2/16/22; carotid doppler 35% MARIBELL stenosis; TTE at [facility] 2/187/22 LVEF 45-50% with negative bubble study; 30-day event monitor showed NSR with PACs and NSVT; and ELEANOR screen pending July 2022 at [facility]; right eye vision is absent, but can read with left eye; has resumed driving  -sees ophtho Dr Eldon Adler at [facility]     Glaucoma right eye  -cont pred acetate OD QID  -cont prostaglandin analog qhs OD  -sees ophtho Dr Kisha Gonzalez at [facility]     Carotid artery stenosis  CTA carotid arteries in Feb 2022 show 35% MARIBELL stenosis;     History of Right inguinal hernia repair  s/p repair in April 2022 by Dr Jose Mobley at [place]     Right wrist osteoarthritis  XR right wrist in 2017 showed radiocarpal arthritis, including osteophytosis at the volar lip of   the radius. DISI deformity  with dorsal tilt of the lunate.  Osteoarthritis at the thumb interphalangeal and CMC joints;   -takes Advil 400 mg po daiy  -pt referred to orthopedics Dr Chin Odom at [place]     Mouth sores  Possible herpetic; takes acyclovir 400 mg po TID x7d prn outbreaks     Osteoarthritis left knee  S/p left TKR on 3/1/17 at [facility] under care of orthopedics Dr Borrero  CAD (coronary artery disease)  on ASA 81 mg po qD; Rx per Dr Artie Barker; last stress test 2011 per pt; CATH 12/8/16 shows There was no significant in-stent disease. Distal left main with 20 % stenosis, proximal LAD: There was a 0 % stenosis at the site of a prior stent. Mid LAD: There was a discrete 50 % stenosis. There  was RAMY grade 3 flow through the vessel (brisk flow). Proximal circumflex: There was a 30 % stenosis. Proximal RCA: There was a 30 % stenosis; medical management recommended by Dr Barker  -CATH on 3/15/22 at [facility] shows non-obstructive CAD (LM normal, Distal LAD is small with focal 50% lesion, LCX normal and supplies two small OM branches, RCA with PL and PDA branches. 20-30% proximal RCA disease. )  -on Coreg 3.125 mg po BID, ASA 81 mg po qD  HTN (hypertension)  on Coreg 3.125 mg po BID, lisininopril 10 mg po qD ,check CBC, TSH  Hypercholesteremia  LDL 34 in March 2022 on atorvastatin 40 mg po qHS; Rx per Dr Barker  CKD  Creatinine 1.50 in June 2024; stable c/w creatinine 1.45 in March 2022     Allergic rhinitis   Using loratadine 10 mg po every day, and montelukast 10 mg po qD; does not like to use Flonase NS 2 sp EN qD;      PSA screening  PSA 0.1 in April 2018; would not check PSA due to age >70     History of COVID infection   In Aug 2021     RTC 6 mos  MWE 6/6/22     Spent 30 minutes obtaining history, evaluating patient, discussing treatment options, diet, exercise, review of available labs and radiology reports, and completing documentation.                Orders This Visit:  No orders of the defined types were  placed in this encounter.      Meds This Visit:  Requested Prescriptions      No prescriptions requested or ordered in this encounter       Imaging & Referrals:  ELECTROCARDIOGRAM, COMPLETE     6/24/2024  Terell Hernandez MD

## 2024-06-25 ENCOUNTER — TELEPHONE (OUTPATIENT)
Dept: INTERNAL MEDICINE CLINIC | Facility: CLINIC | Age: 84
End: 2024-06-25

## 2024-06-25 LAB
ATRIAL RATE: 62 BPM
P AXIS: 33 DEGREES
P-R INTERVAL: 196 MS
Q-T INTERVAL: 466 MS
QRS DURATION: 148 MS
QTC CALCULATION (BEZET): 472 MS
R AXIS: 38 DEGREES
T AXIS: -87 DEGREES
VENTRICULAR RATE: 62 BPM

## 2024-06-25 NOTE — TELEPHONE ENCOUNTER
Please FAX OV 6/24/24 with EKG to ; surgery June 27th 2024 ; fax:attention:[facility] [phone]    To nursing; please FAX above

## 2024-06-26 NOTE — TELEPHONE ENCOUNTER
Office note from 6/24/24 and EKG faxed to DR. Witt /NO Coulter at [phone]-confirmation received

## 2024-07-15 ENCOUNTER — OFFICE VISIT (OUTPATIENT)
Dept: DERMATOLOGY CLINIC | Facility: CLINIC | Age: 84
End: 2024-07-15

## 2024-07-15 DIAGNOSIS — L82.1 SEBORRHEIC KERATOSIS: ICD-10-CM

## 2024-07-15 DIAGNOSIS — L57.0 ACTINIC KERATOSIS: Primary | ICD-10-CM

## 2024-07-15 NOTE — PROGRESS NOTES
HPI:    Patient ID: Chin Mercer is a 83 year old male.    Patient presents with skin growths on his scalp and left arm. Denies scaling. No draining or tenderness noted. No itching. Golfs frequently outside. Wears hats, but does not wear sun screen. Denies personal or family hx of skin cancer. No draining or tenderness noted. No allergies to medications noted.         Review of Systems   Constitutional:  Negative for chills and fever.   Musculoskeletal:  Negative for arthralgias and myalgias.   Skin:  Positive for rash. Negative for color change and wound.            Current Outpatient Medications   Medication Sig Dispense Refill    dorzolamide-timolol 2-0.5 % Ophthalmic Solution       latanoprost 0.005 % Ophthalmic Solution       Tadalafil 10 MG Oral Tab Take 1 tablet (10 mg total) by mouth daily as needed for Erectile Dysfunction. 10 tablet 5    lisinopril 10 MG Oral Tab Take 1 tablet (10 mg total) by mouth daily.  0    carvedilol 3.125 MG Oral Tab Take 1 tablet (3.125 mg total) by mouth 2 (two) times daily with meals.  0    atorvastatin 40 MG Oral Tab Take 1 tablet (40 mg total) by mouth nightly.  0    loratadine 10 MG Oral Tab Take 1 tablet (10 mg total) by mouth daily. 30 tablet 3    aspirin 81 MG Oral Chew Tab Chew  by mouth. aspirin 81 mg effervescent tablet       Allergies:  Allergies   Allergen Reactions    Nuts PAIN      There were no vitals taken for this visit.  There is no height or weight on file to calculate BMI.  PHYSICAL EXAM:   Physical Exam  Constitutional:       General: He is not in acute distress.     Appearance: Normal appearance.   Skin:     General: Skin is warm and dry.      Findings: Rash present.      Comments: Aks noted throughout the sclap. No draining or tenderness noted. Tight pink macules noted on the scalp.     Sks noted on the left arm and the scalp. No draining or tenderness noted. Stuck on appearance noted.    Neurological:      Mental Status: He is alert and oriented to  person, place, and time.                ASSESSMENT/PLAN:   1. Actinic keratosis  -After discussion with patient, advised the following:  - Cryosurgery of non-malignant lesion(s)  - Risks, benefits, alternatives and personnel required for cryosurgery reviewed with patient. Discussed the expected redness, as well as the risks of swelling, blistering, crusting, pigmentary changes, and permanent scarring. . Discussed that lesion may need additional treatments in future or may recur. Pt verbalizes understanding and wishes to proceed.   - Cryosurgery performed with Liquid Nitrogen via cryostat spray gun to actinic keratosis. 15 lesion(s) treated.   - Patient tolerated well and wound care discussed.   -Pt was agreeable to plan and will comply with discussion above.         2. Seborrheic keratosis  -After discussion with patient, advised the following:  - Cryosurgery of non-malignant lesion(s)  - Risks, benefits, alternatives and personnel required for cryosurgery reviewed with patient. Discussed the expected redness, as well as the risks of swelling, blistering, crusting, pigmentary changes, and permanent scarring. . Discussed that lesion may need additional treatments in future or may recur. Pt verbalizes understanding and wishes to proceed.   - Cryosurgery performed with Liquid Nitrogen via cryostat spray gun to seborrheic keratoses. 3 lesion(s) treated.   - Patient tolerated well and wound care discussed.   -Pt was agreeable to plan and will comply with discussion above.           No orders of the defined types were placed in this encounter.      Meds This Visit:  Requested Prescriptions      No prescriptions requested or ordered in this encounter       Imaging & Referrals:  None         ID#[id number]

## 2024-10-07 ENCOUNTER — OFFICE VISIT (OUTPATIENT)
Dept: INTERNAL MEDICINE CLINIC | Facility: CLINIC | Age: 84
End: 2024-10-07

## 2024-10-07 VITALS
HEIGHT: 69 IN | SYSTOLIC BLOOD PRESSURE: 118 MMHG | BODY MASS INDEX: 29.47 KG/M2 | HEART RATE: 59 BPM | TEMPERATURE: 98 F | DIASTOLIC BLOOD PRESSURE: 60 MMHG | WEIGHT: 199 LBS | OXYGEN SATURATION: 99 %

## 2024-10-07 DIAGNOSIS — J32.9 SINUSITIS, UNSPECIFIED CHRONICITY, UNSPECIFIED LOCATION: Primary | ICD-10-CM

## 2024-10-07 DIAGNOSIS — N18.30 STAGE 3 CHRONIC KIDNEY DISEASE, UNSPECIFIED WHETHER STAGE 3A OR 3B CKD (HCC): ICD-10-CM

## 2024-10-07 DIAGNOSIS — I25.10 CORONARY ARTERY DISEASE INVOLVING NATIVE CORONARY ARTERY OF NATIVE HEART WITHOUT ANGINA PECTORIS: ICD-10-CM

## 2024-10-07 DIAGNOSIS — K43.9 ABDOMINAL WALL HERNIA: ICD-10-CM

## 2024-10-07 DIAGNOSIS — E78.00 HYPERCHOLESTEREMIA: ICD-10-CM

## 2024-10-07 DIAGNOSIS — C18.9 MALIGNANT NEOPLASM OF COLON, UNSPECIFIED PART OF COLON (HCC): ICD-10-CM

## 2024-10-07 DIAGNOSIS — I10 BENIGN HYPERTENSION: ICD-10-CM

## 2024-10-07 PROCEDURE — G2211 COMPLEX E/M VISIT ADD ON: HCPCS | Performed by: INTERNAL MEDICINE

## 2024-10-07 PROCEDURE — 99214 OFFICE O/P EST MOD 30 MIN: CPT | Performed by: INTERNAL MEDICINE

## 2024-10-07 RX ORDER — CEFUROXIME AXETIL 500 MG/1
500 TABLET ORAL 2 TIMES DAILY
Qty: 20 TABLET | Refills: 0 | Status: SHIPPED | OUTPATIENT
Start: 2024-10-07

## 2024-10-07 NOTE — PROGRESS NOTES
Chin Mercer is a 84 year old male.    HPI:     Chief Complaint   Patient presents with    Cough     Pt here due to ongoing cough x 3 weeks , states son lives with him and tested positive x 1 week ago, states he took the covid test 4 days before son took the test and tested negative, symptoms only include green phlegm otherwise no other known symptoms, however states wife has headaches now.        83 y/o M with 3 week +sinus and nasal congestion; +cough; no F/C; no SOB; +post-nasal drip        HISTORY:  Past Medical History:    Abnormal LFTs (liver function tests)    CAD (coronary artery disease)    s/p PCI/stent. Cardio Dr rAtie Barker [facility]     Carpal tunnel syndrome    Colon cancer (HCC)    S/p Status post laparoscopic right hemicolectomy on 2023 for adenocarcinoma of the colon by surgeon Dr Jeff Witt at ; stage pT2N0    COVID    Fall    Other and unspecified hyperlipidemia    Unspecified essential hypertension      Past Surgical History:   Procedure Laterality Date    Electrocardiogram, complete      Scanned to media tab - DOS 10-    Other Bilateral     carpal tunnel releases    Other Left     left knee replacement    Pt w/ coronary artery stent  2010      Family History   Problem Relation Age of Onset    Diabetes Mother     Lipids Mother     Hypertension Mother     Heart Disease Brother         CAD    Prostate Cancer Brother       Social History:   Social History     Socioeconomic History    Marital status:    Tobacco Use    Smoking status: Former     Current packs/day: 0.00     Average packs/day: 0.5 packs/day for 9.0 years (4.5 ttl pk-yrs)     Types: Cigarettes     Start date: 1965     Quit date: 1974     Years since quittin.2    Smokeless tobacco: Never   Vaping Use    Vaping status: Never Used   Substance and Sexual Activity    Alcohol use: Yes     Alcohol/week: 0.0 standard drinks of alcohol     Comment: 7 glasses of wine weekly    Drug use: No   Other Topics  Concern    Caffeine Concern Yes     Comment: Coffee 2 cups daily    History of tanning Yes    Reaction to local anesthetic No    Pt has a pacemaker No    Pt has a defibrillator No     Social Determinants of Health     Financial Resource Strain: Low Risk  (2/17/2022)    Received from [facility]    Overall Financial Resource Strain (CARDIA)     Difficulty of Paying Living Expenses: Not very hard   Food Insecurity: Low Risk  (5/19/2023)    Received from [facility]    Food Insecurity     Have there been times that your food ran out, and you didn't have money to get more?: No     Are there times that you worry that this might happen?: No   Transportation Needs: Low Risk  (5/19/2023)    Received from [facility]    Transportation Needs     Do you have trouble getting transportation to medical appointments?: No   Physical Activity: Inactive (2/17/2022)    Received from [facility]    Exercise Vital Sign     Days of Exercise per Week: 0 days     Minutes of Exercise per Session: 0 min   Stress: No Stress Concern Present (2/17/2022)    Received from [facility]    [employer] - Occupational Stress Questionnaire     Feeling of Stress : Not at all   Social Connections: Moderately Isolated (2/17/2022)    Received from [facility]    Social Connection and Isolation Panel [NHANES]     Frequency of Communication with Friends and Family: Twice a week     Frequency of Social Gatherings with Friends and Family: Once a week     Attends Mandaeism Services: Never     Active Member of Clubs or Organizations: No     Attends Club or Organization Meetings: Never     Marital Status:         Medications (Active prior to today's visit):  Current Outpatient Medications   Medication Sig Dispense Refill    cefuroxime 500 MG Oral Tab Take 1 tablet (500 mg total) by mouth 2 (two) times daily. 20 tablet 0    dorzolamide-timolol 2-0.5 %  Ophthalmic Solution       latanoprost 0.005 % Ophthalmic Solution       Tadalafil 10 MG Oral Tab Take 1 tablet (10 mg total) by mouth daily as needed for Erectile Dysfunction. 10 tablet 5    lisinopril 10 MG Oral Tab Take 1 tablet (10 mg total) by mouth daily.  0    carvedilol 3.125 MG Oral Tab Take 1 tablet (3.125 mg total) by mouth 2 (two) times daily with meals.  0    atorvastatin 40 MG Oral Tab Take 1 tablet (40 mg total) by mouth nightly.  0    loratadine 10 MG Oral Tab Take 1 tablet (10 mg total) by mouth daily. 30 tablet 3    aspirin 81 MG Oral Chew Tab Chew  by mouth. aspirin 81 mg effervescent tablet         Allergies:  Allergies   Allergen Reactions    Nuts PAIN                 ROS:   Constitutional: no weight loss; no fatigue  Cardiovascular:  Negative for chest pain; negative palpitations  Respiratory:  Negative for cough, dyspnea and wheezing  Gastrointestinal:  Negative for abdominal pain, constipation, decreased appetite, diarrhea and vomiting; no melena or hematochezia  All other review of systems are negative.        PHYSICAL EXAM:   Blood pressure 118/60, pulse 59, temperature 97.9 °F (36.6 °C), height 5' 9\" (1.753 m), weight 199 lb (90.3 kg), SpO2 99%.  Constitutional: alert and oriented x3 in no acute distress  HEENT- EOMI, PERRL  Nose/Mouth/Throat: pharynx without erythema; no oral lesions  Neck/Thyroid: neck supple; no thyromegaly  Cardiovascular: RRR, S1, S2, no S3 or murmur  Respiratory: lungs without crackles or wheezes  Abdomen: normoactive bowel sounds, soft, non-tender and non-distended  Extremities: no clubbing, cyanosis or edema           ASSESSMENT/PLAN:   Sinusitis  Nares COVID neg 2 weeks ago;   -Ceftin 500 mg po BID #20    Abdominal wall hernia  To mid-left abdomen  -s/p repair by surgeon Dr Jeff Witt in Juny 2024;      Colon cancer  S/p Status post laparoscopic right hemicolectomy on 5/25/2023 for adenocarcinoma of the colon by surgeon Dr Jeff Witt at ; stage pT2N0; had  seen oncologist Dr Dena Barker at time of surgery, though has had no follow-up since  -PET/CT on 9/14/24 showed no evidence eof metastatic disease; awaits MRI liver per Dr Barker  -advise see oncologist Dr Barker      Health Maintenance  Flu vax annually    Colon polyps  Had colonoscopy in Aug 2024 by GI Dr Hammond at ; two polyps removed; repeat in 3 years, or Aug 2027     Right central retinal artery occlusion  Occurred on 2/16/22; carotid doppler 35% MARIBELL stenosis; TTE at [facility] 2/187/22 LVEF 45-50% with negative bubble study; 30-day event monitor showed NSR with PACs and NSVT; and ELEANOR screen pending July 2022 at [facility]; right eye vision is absent, but can read with left eye; has resumed driving  -sees ophtho Dr Eldon Adler at [facility]     Glaucoma right eye  -cont pred acetate OD QID  -cont prostaglandin analog qhs OD  -sees ophtho Dr Kisha Gonzalez at [facility]     Carotid artery stenosis  CTA carotid arteries in Feb 2022 show 35% MARIBELL stenosis;     History of Right inguinal hernia repair  s/p repair in April 2022 by Dr Jose Mobley at [place]     Right wrist osteoarthritis  XR right wrist in 2017 showed radiocarpal arthritis, including osteophytosis at the volar lip of   the radius. DISI deformity with dorsal tilt of the lunate.  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 Distal left main with 20 % stenosis, proximal LAD: There was a 0 % stenosis at the site of a prior stent. Mid LAD: There was a discrete 50 % stenosis. There  was RAMY grade 3 flow through the  vessel (brisk flow). Proximal circumflex: There was a 30 % stenosis. Proximal RCA: There was a 30 % stenosis; medical management recommended by Dr Barker  -CATH on 3/15/22 at [facility] shows non-obstructive CAD (LM normal, Distal LAD is small with focal 50% lesion, LCX normal and supplies two small OM branches, RCA with PL and PDA branches. 20-30% proximal RCA disease. )  -on Coreg 3.125 mg po BID, ASA 81 mg po qD  HTN (hypertension)  on Coreg 3.125 mg po BID, lisininopril 10 mg po qD ,check CBC, TSH  Hypercholesteremia  LDL 34 in March 2022 on atorvastatin 40 mg po qHS; Rx per Dr Barker  CKD  Creatinine 1.35 in Aug 2024     Allergic rhinitis   Using loratadine 10 mg po every day, and montelukast 10 mg po qD; does not like to use Flonase NS 2 sp EN qD;      PSA screening  PSA 0.1 in April 2018; would not check PSA due to age >70     History of COVID infection   In Aug 2021     RTC 6 mos  MWE 6/6/22     Spent 30 minutes obtaining history, evaluating patient, discussing treatment options, diet, exercise, review of available labs and radiology reports, and completing documentation.                   Orders This Visit:  No orders of the defined types were placed in this encounter.      Meds This Visit:  Requested Prescriptions     Signed Prescriptions Disp Refills    cefuroxime 500 MG Oral Tab 20 tablet 0     Sig: Take 1 tablet (500 mg total) by mouth 2 (two) times daily.       Imaging & Referrals:  None     10/7/2024  Terell Hernandez MD

## 2024-10-14 ENCOUNTER — TELEPHONE (OUTPATIENT)
Dept: INTERNAL MEDICINE CLINIC | Facility: CLINIC | Age: 84
End: 2024-10-14

## 2024-10-14 RX ORDER — NYSTATIN 100000 [USP'U]/ML
5 SUSPENSION ORAL 4 TIMES DAILY
Qty: 480 ML | Refills: 0 | Status: SHIPPED | OUTPATIENT
Start: 2024-10-14

## 2024-10-14 NOTE — TELEPHONE ENCOUNTER
Patient is calling he saw Dr Hernandez on 10/7 and was prescribed Cefuroxime.    After taking 3 pills patient started to develop sores in his mouth.  Patient remembered in 2022 he took the same medication and developed sores in his mouth then.  Dr Hernandez prescribed a mouth wash he thinks it was Nystatin 100,000.    Patient is asking if he can get a script called in for the mouth wash.    He has stopped the medication, the sinus infection has cleared    Please call [phone]
